# Patient Record
Sex: MALE | Race: WHITE | NOT HISPANIC OR LATINO
[De-identification: names, ages, dates, MRNs, and addresses within clinical notes are randomized per-mention and may not be internally consistent; named-entity substitution may affect disease eponyms.]

---
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
## 2023-01-04 ENCOUNTER — APPOINTMENT (OUTPATIENT)
Dept: GASTROENTEROLOGY | Facility: CLINIC | Age: 18
End: 2023-01-04
Payer: COMMERCIAL

## 2023-01-04 VITALS
HEART RATE: 71 BPM | HEIGHT: 70 IN | WEIGHT: 169 LBS | BODY MASS INDEX: 24.2 KG/M2 | TEMPERATURE: 97.9 F | DIASTOLIC BLOOD PRESSURE: 84 MMHG | SYSTOLIC BLOOD PRESSURE: 144 MMHG | OXYGEN SATURATION: 98 % | RESPIRATION RATE: 16 BRPM

## 2023-01-04 PROCEDURE — 99214 OFFICE O/P EST MOD 30 MIN: CPT

## 2023-01-05 RX ORDER — INFLIXIMAB-DYYB 120 MG/ML
700 INJECTION SUBCUTANEOUS ONCE
Refills: 0 | Status: COMPLETED | OUTPATIENT
Start: 2023-01-06 | End: 2023-01-06

## 2023-01-05 NOTE — PHYSICAL EXAM
[Healthy Appearing] : healthy appearing [No Acute Distress] : no acute distress [Sclera] : the sclera and conjunctiva were normal [Hearing Threshold Finger Rub Not [place]] : hearing was normal [Normal Appearance] : the appearance of the neck was normal [No Acc Muscle Use] : no accessory muscle use [Auscultation Breath Sounds / Voice Sounds] : lungs were clear to auscultation bilaterally [Heart Rate And Rhythm] : heart rate was normal and rhythm regular [Normal S1, S2] : normal S1 and S2 [Bowel Sounds] : normal bowel sounds [Abdomen Tenderness] : non-tender [No Masses] : no abdominal mass palpated [Abdomen Soft] : soft [No CVA Tenderness] : no CVA  tenderness [No Spinal Tenderness] : no spinal tenderness [Abnormal Walk] : normal gait [] : no rash [No Focal Deficits] : no focal deficits [Oriented To Time, Place, And Person] : oriented to person, place, and time [Normal Insight/Judgment] : insight and judgment were intact

## 2023-01-06 ENCOUNTER — OUTPATIENT (OUTPATIENT)
Dept: OUTPATIENT SERVICES | Facility: HOSPITAL | Age: 18
LOS: 1 days | End: 2023-01-06
Payer: COMMERCIAL

## 2023-01-06 ENCOUNTER — APPOINTMENT (OUTPATIENT)
Dept: INFUSION THERAPY | Facility: CLINIC | Age: 18
End: 2023-01-06

## 2023-01-06 VITALS
HEART RATE: 84 BPM | RESPIRATION RATE: 18 BRPM | TEMPERATURE: 98 F | SYSTOLIC BLOOD PRESSURE: 117 MMHG | OXYGEN SATURATION: 99 % | DIASTOLIC BLOOD PRESSURE: 62 MMHG

## 2023-01-06 DIAGNOSIS — K50.90 CROHN'S DISEASE, UNSPECIFIED, WITHOUT COMPLICATIONS: ICD-10-CM

## 2023-01-06 LAB
ALBUMIN SERPL ELPH-MCNC: 4.8 G/DL — SIGNIFICANT CHANGE UP (ref 3.3–5)
ALP SERPL-CCNC: 66 U/L — SIGNIFICANT CHANGE UP (ref 40–120)
ALT FLD-CCNC: 17 U/L — SIGNIFICANT CHANGE UP (ref 10–45)
ANION GAP SERPL CALC-SCNC: 11 MMOL/L — SIGNIFICANT CHANGE UP (ref 5–17)
AST SERPL-CCNC: 22 U/L — SIGNIFICANT CHANGE UP (ref 10–40)
BILIRUB SERPL-MCNC: 0.4 MG/DL — SIGNIFICANT CHANGE UP (ref 0.2–1.2)
BUN SERPL-MCNC: 14 MG/DL — SIGNIFICANT CHANGE UP (ref 7–23)
CALCIUM SERPL-MCNC: 9.9 MG/DL — SIGNIFICANT CHANGE UP (ref 8.4–10.5)
CHLORIDE SERPL-SCNC: 103 MMOL/L — SIGNIFICANT CHANGE UP (ref 96–108)
CO2 SERPL-SCNC: 26 MMOL/L — SIGNIFICANT CHANGE UP (ref 22–31)
CREAT SERPL-MCNC: 0.84 MG/DL — SIGNIFICANT CHANGE UP (ref 0.5–1.3)
CRP SERPL-MCNC: <3 MG/L — SIGNIFICANT CHANGE UP (ref 0–4)
EGFR: 129 ML/MIN/1.73M2 — SIGNIFICANT CHANGE UP
GLUCOSE SERPL-MCNC: 79 MG/DL — SIGNIFICANT CHANGE UP (ref 70–99)
HCT VFR BLD CALC: 43.5 % — SIGNIFICANT CHANGE UP (ref 39–50)
HGB BLD-MCNC: 15.2 G/DL — SIGNIFICANT CHANGE UP (ref 13–17)
MCHC RBC-ENTMCNC: 30.4 PG — SIGNIFICANT CHANGE UP (ref 27–34)
MCHC RBC-ENTMCNC: 34.9 GM/DL — SIGNIFICANT CHANGE UP (ref 32–36)
MCV RBC AUTO: 87 FL — SIGNIFICANT CHANGE UP (ref 80–100)
NRBC # BLD: 0 /100 WBCS — SIGNIFICANT CHANGE UP (ref 0–0)
PLATELET # BLD AUTO: 317 K/UL — SIGNIFICANT CHANGE UP (ref 150–400)
POTASSIUM SERPL-MCNC: 4.2 MMOL/L — SIGNIFICANT CHANGE UP (ref 3.5–5.3)
POTASSIUM SERPL-SCNC: 4.2 MMOL/L — SIGNIFICANT CHANGE UP (ref 3.5–5.3)
PROT SERPL-MCNC: 7.8 G/DL — SIGNIFICANT CHANGE UP (ref 6–8.3)
RBC # BLD: 5 M/UL — SIGNIFICANT CHANGE UP (ref 4.2–5.8)
RBC # FLD: 12.3 % — SIGNIFICANT CHANGE UP (ref 10.3–14.5)
SODIUM SERPL-SCNC: 140 MMOL/L — SIGNIFICANT CHANGE UP (ref 135–145)
WBC # BLD: 7.26 K/UL — SIGNIFICANT CHANGE UP (ref 3.8–10.5)
WBC # FLD AUTO: 7.26 K/UL — SIGNIFICANT CHANGE UP (ref 3.8–10.5)

## 2023-01-06 PROCEDURE — 80053 COMPREHEN METABOLIC PANEL: CPT

## 2023-01-06 PROCEDURE — 36415 COLL VENOUS BLD VENIPUNCTURE: CPT

## 2023-01-06 PROCEDURE — 80230 DRUG ASSAY INFLIXIMAB: CPT

## 2023-01-06 PROCEDURE — 82542 COL CHROMOTOGRAPHY QUAL/QUAN: CPT

## 2023-01-06 PROCEDURE — 85027 COMPLETE CBC AUTOMATED: CPT

## 2023-01-06 PROCEDURE — 96415 CHEMO IV INFUSION ADDL HR: CPT

## 2023-01-06 PROCEDURE — 86140 C-REACTIVE PROTEIN: CPT

## 2023-01-06 PROCEDURE — 96413 CHEMO IV INFUSION 1 HR: CPT

## 2023-01-06 RX ADMIN — INFLIXIMAB-DYYB 155 MILLIGRAM(S): 120 INJECTION SUBCUTANEOUS at 14:02

## 2023-01-06 RX ADMIN — INFLIXIMAB-DYYB 700 MILLIGRAM(S): 120 INJECTION SUBCUTANEOUS at 16:05

## 2023-01-15 NOTE — ASSESSMENT
[FreeTextEntry1] : Pt is a 18 y/o male with ileal CD diagnosed age 12/13 and started on IFX at that time, now on IFX 10mg/kg q5 weeks presenting for routine f/u. Pt reports feeling well with 1-2 daily BM with no urgency, bleeding, or abdominal pain. Denies N/V, and able to tolerate regular meals. He does tend toward constipation at times but not to a significant degree as per pt. 3/2022 calpro wnl. CRP remains wnl. Last IFX level 12/2022 was 12.2 with no abs. Last EGD/colonoscopy 2016 as above.  Mother is neuro onc at [employer].\par \par # Ileal CD; in clinical remission\par - c/w IFX 10mg/kg q5 weeks\par - currently getting dosed over 2hrs since initiating therapy, but never had infusion rxn; will attempt to decrease to 90 min infusion\par - Will plan for colonoscopy as pt will begin surveillance in upcoming year\par - reports requiring "extra" dose of anesthesia in the past and is very anxious about having similar experience; may require low dose anxiolytic prior to procedure\par \par #HCM\par S/p COVID vaccination including booster\par S/p Flu vaccination this season\par Dermatology referral previously given, not gone yet; reminded again today and explained r/b of tnf drugs. Pt states he will go to derm his family uses on [place]\par HPV vaccine with PCP\par PCP - Benny Crump \par Student at [employer] and will be coming to the city for treatment. \par \par f/u post procedure\par \par \par Philip Silver DO\par [facility] IBD Fellow \par

## 2023-01-15 NOTE — HISTORY OF PRESENT ILLNESS
[FreeTextEntry1] : Pt is a 18 y/o male with ileal CD diagnosed age [age] and started on IFX at that time, now on IFX 10mg/kg q5 weeks presenting for routine f/u. Pt reports feeling well with 1-2 daily BM with no urgency, bleeding, or abdominal pain. Denies N/V, and able to tolerate regular meals. He does tend toward constipation at times but not to a significant degree as per pt. 3/2022 calpro wnl. CRP remains wnl. Last IFX level 12/2022 was 12.2 with no abs. Last EGD/colonoscopy 2016 as below.\par \par Prior HPI:\par \par 17 y/o Male with Ileal CD, first seen in 12/2021 to transition care to Adult clinic. Currently on IFX 10 mg/kg q5 weeks. \par \par Reports no active complains \par Stools at baseline- 1-2/day, no blood or mucous \par Denies fever, chills, nausea, vomiting, melena, hematochezia, diarrhea, nocturnal symptoms\par Good appetite and stable weight. \par No EIMs (Joint pain, mouth ulcers, skin rash)\par \par F Melvin not done yet\par Last Drug levels 10.5 with no antibodies \par \par Previous history \par Patient was initially diagnosed at 11 years of age, Chief complain was excessive fatigue, persistent fever and eventually diarrhea. Found to have terminal ileal CD and started on IFX with improvement in symptoms, Has been on IFX since then, infusion frequency changed according to drug levels. No reported flares/hospitalizations. Currently denies any symptoms. Regular 1-2 BM/day with no blood or mucous. \par \par EGD and Colon 7/2016\par EGD - \par Proximal esophagus polyps - \par Distal esophagus with white plaques\par Prepyloric erythema, superficial ulceration \par Duodenal bulb - scattered superficial ulcerations and while plaques\par \par Colon \par Scattered aphthae in patchy distribution form sigmoid at 20 cm to the cecum\par Superficial ulcerations were seen in patchy distribution from splenic flexure to the cecum \par Deep linear ulcerations in patchy distribution up to 15 cm of the terminal ileum, 15-30 cm with some erythema but no ulcerations. \par \par US Abdomen 7/2016\par Wall thickening (0.4 cm) and areas of hyperemia in distal 12 cm of TI\par No evidence of stricture, although there is luminal narrowing and diminished peristalsis throughout most of the involved terminal ileum \par 1 slightly thickened bowel loop left lower quadrant \par \par Capsule 2017 - Normal study with no evidence of active small bowel CD\par \par His drug levels have been good with no antibodies detected. \par As per mother his CRP and Platelets track the inflammation. \par Currently a college student at [employer],\par Denies any recreational drug use,\par Occasional alcohol use\par \par Family history significant for Mother with Raynaud, Maternal Grandfather with sjogren disease; Father side of family with Lymphoma.

## 2023-01-15 NOTE — CONSULT LETTER
[Dear  ___] : Dear  [unfilled], [Courtesy Letter:] : I had the pleasure of seeing your patient, [unfilled], in my office today. [Please see my note below.] : Please see my note below. [Sincerely,] : Sincerely, [FreeTextEntry3] : Subhash Hensley MD\par Professor of Medicine\par Chief of GI\par Director IBD Program\par [facility]\par

## 2023-02-10 ENCOUNTER — APPOINTMENT (OUTPATIENT)
Dept: INFUSION THERAPY | Facility: CLINIC | Age: 18
End: 2023-02-10

## 2023-02-10 ENCOUNTER — OUTPATIENT (OUTPATIENT)
Dept: OUTPATIENT SERVICES | Facility: HOSPITAL | Age: 18
LOS: 1 days | End: 2023-02-10
Payer: COMMERCIAL

## 2023-02-10 VITALS
OXYGEN SATURATION: 98 % | HEART RATE: 74 BPM | RESPIRATION RATE: 18 BRPM | TEMPERATURE: 99 F | SYSTOLIC BLOOD PRESSURE: 135 MMHG | DIASTOLIC BLOOD PRESSURE: 82 MMHG

## 2023-02-10 DIAGNOSIS — K50.90 CROHN'S DISEASE, UNSPECIFIED, WITHOUT COMPLICATIONS: ICD-10-CM

## 2023-02-10 LAB
ALBUMIN SERPL ELPH-MCNC: 4.7 G/DL — SIGNIFICANT CHANGE UP (ref 3.3–5)
ALP SERPL-CCNC: 70 U/L — SIGNIFICANT CHANGE UP (ref 40–120)
ALT FLD-CCNC: 15 U/L — SIGNIFICANT CHANGE UP (ref 10–45)
ANION GAP SERPL CALC-SCNC: 10 MMOL/L — SIGNIFICANT CHANGE UP (ref 5–17)
AST SERPL-CCNC: 20 U/L — SIGNIFICANT CHANGE UP (ref 10–40)
BILIRUB SERPL-MCNC: 0.2 MG/DL — SIGNIFICANT CHANGE UP (ref 0.2–1.2)
BUN SERPL-MCNC: 12 MG/DL — SIGNIFICANT CHANGE UP (ref 7–23)
CALCIUM SERPL-MCNC: 9.9 MG/DL — SIGNIFICANT CHANGE UP (ref 8.4–10.5)
CHLORIDE SERPL-SCNC: 105 MMOL/L — SIGNIFICANT CHANGE UP (ref 96–108)
CO2 SERPL-SCNC: 28 MMOL/L — SIGNIFICANT CHANGE UP (ref 22–31)
CREAT SERPL-MCNC: 0.76 MG/DL — SIGNIFICANT CHANGE UP (ref 0.5–1.3)
CRP SERPL-MCNC: <3 MG/L — SIGNIFICANT CHANGE UP (ref 0–4)
EGFR: 133 ML/MIN/1.73M2 — SIGNIFICANT CHANGE UP
GLUCOSE SERPL-MCNC: 91 MG/DL — SIGNIFICANT CHANGE UP (ref 70–99)
HCT VFR BLD CALC: 42.2 % — SIGNIFICANT CHANGE UP (ref 39–50)
HGB BLD-MCNC: 14.7 G/DL — SIGNIFICANT CHANGE UP (ref 13–17)
MCHC RBC-ENTMCNC: 30.6 PG — SIGNIFICANT CHANGE UP (ref 27–34)
MCHC RBC-ENTMCNC: 34.8 GM/DL — SIGNIFICANT CHANGE UP (ref 32–36)
MCV RBC AUTO: 87.9 FL — SIGNIFICANT CHANGE UP (ref 80–100)
NRBC # BLD: 0 /100 WBCS — SIGNIFICANT CHANGE UP (ref 0–0)
PLATELET # BLD AUTO: 286 K/UL — SIGNIFICANT CHANGE UP (ref 150–400)
POTASSIUM SERPL-MCNC: 4.3 MMOL/L — SIGNIFICANT CHANGE UP (ref 3.5–5.3)
POTASSIUM SERPL-SCNC: 4.3 MMOL/L — SIGNIFICANT CHANGE UP (ref 3.5–5.3)
PROT SERPL-MCNC: 7.6 G/DL — SIGNIFICANT CHANGE UP (ref 6–8.3)
RBC # BLD: 4.8 M/UL — SIGNIFICANT CHANGE UP (ref 4.2–5.8)
RBC # FLD: 12.4 % — SIGNIFICANT CHANGE UP (ref 10.3–14.5)
SODIUM SERPL-SCNC: 143 MMOL/L — SIGNIFICANT CHANGE UP (ref 135–145)
WBC # BLD: 6.37 K/UL — SIGNIFICANT CHANGE UP (ref 3.8–10.5)
WBC # FLD AUTO: 6.37 K/UL — SIGNIFICANT CHANGE UP (ref 3.8–10.5)

## 2023-02-10 PROCEDURE — 85027 COMPLETE CBC AUTOMATED: CPT

## 2023-02-10 PROCEDURE — 80230 DRUG ASSAY INFLIXIMAB: CPT

## 2023-02-10 PROCEDURE — 36415 COLL VENOUS BLD VENIPUNCTURE: CPT

## 2023-02-10 PROCEDURE — 80053 COMPREHEN METABOLIC PANEL: CPT

## 2023-02-10 PROCEDURE — 96413 CHEMO IV INFUSION 1 HR: CPT

## 2023-02-10 PROCEDURE — 86140 C-REACTIVE PROTEIN: CPT

## 2023-02-10 PROCEDURE — 82542 COL CHROMOTOGRAPHY QUAL/QUAN: CPT

## 2023-02-10 RX ORDER — INFLIXIMAB-DYYB 120 MG/ML
700 INJECTION SUBCUTANEOUS ONCE
Refills: 0 | Status: COMPLETED | OUTPATIENT
Start: 2023-02-10 | End: 2023-02-10

## 2023-02-10 RX ADMIN — INFLIXIMAB-DYYB 700 MILLIGRAM(S): 120 INJECTION SUBCUTANEOUS at 11:15

## 2023-02-10 RX ADMIN — INFLIXIMAB-DYYB 155 MILLIGRAM(S): 120 INJECTION SUBCUTANEOUS at 09:45

## 2023-02-14 LAB
ANTIBODIES TO INFLIXIMAB (ATI) CONCENTRATION: < 3.1 U/ML — SIGNIFICANT CHANGE UP
PROMETHEUS ANSER IFX RESULT: SIGNIFICANT CHANGE UP
PROMETHEUS LABORATORY FOOTER: SIGNIFICANT CHANGE UP
SERUM INFLIXIMAB (IFX) CONCENTRATION: 12.7 UG/ML — SIGNIFICANT CHANGE UP

## 2023-03-17 ENCOUNTER — APPOINTMENT (OUTPATIENT)
Dept: INFUSION THERAPY | Facility: CLINIC | Age: 18
End: 2023-03-17

## 2023-03-17 ENCOUNTER — OUTPATIENT (OUTPATIENT)
Dept: OUTPATIENT SERVICES | Facility: HOSPITAL | Age: 18
LOS: 1 days | End: 2023-03-17
Payer: COMMERCIAL

## 2023-03-17 VITALS
DIASTOLIC BLOOD PRESSURE: 81 MMHG | RESPIRATION RATE: 18 BRPM | HEART RATE: 61 BPM | TEMPERATURE: 98 F | OXYGEN SATURATION: 98 % | SYSTOLIC BLOOD PRESSURE: 134 MMHG

## 2023-03-17 VITALS
HEART RATE: 68 BPM | DIASTOLIC BLOOD PRESSURE: 83 MMHG | TEMPERATURE: 98 F | RESPIRATION RATE: 18 BRPM | SYSTOLIC BLOOD PRESSURE: 150 MMHG | OXYGEN SATURATION: 97 %

## 2023-03-17 DIAGNOSIS — K50.90 CROHN'S DISEASE, UNSPECIFIED, WITHOUT COMPLICATIONS: ICD-10-CM

## 2023-03-17 LAB
ALBUMIN SERPL ELPH-MCNC: 4.8 G/DL — SIGNIFICANT CHANGE UP (ref 3.3–5)
ALP SERPL-CCNC: 60 U/L — SIGNIFICANT CHANGE UP (ref 40–120)
ALT FLD-CCNC: 16 U/L — SIGNIFICANT CHANGE UP (ref 10–45)
ANION GAP SERPL CALC-SCNC: 8 MMOL/L — SIGNIFICANT CHANGE UP (ref 5–17)
AST SERPL-CCNC: 25 U/L — SIGNIFICANT CHANGE UP (ref 10–40)
BILIRUB SERPL-MCNC: 0.4 MG/DL — SIGNIFICANT CHANGE UP (ref 0.2–1.2)
BUN SERPL-MCNC: 15 MG/DL — SIGNIFICANT CHANGE UP (ref 7–23)
CALCIUM SERPL-MCNC: 10.1 MG/DL — SIGNIFICANT CHANGE UP (ref 8.4–10.5)
CHLORIDE SERPL-SCNC: 104 MMOL/L — SIGNIFICANT CHANGE UP (ref 96–108)
CO2 SERPL-SCNC: 29 MMOL/L — SIGNIFICANT CHANGE UP (ref 22–31)
CREAT SERPL-MCNC: 0.82 MG/DL — SIGNIFICANT CHANGE UP (ref 0.5–1.3)
CRP SERPL-MCNC: <3 MG/L — SIGNIFICANT CHANGE UP (ref 0–4)
EGFR: 130 ML/MIN/1.73M2 — SIGNIFICANT CHANGE UP
GLUCOSE SERPL-MCNC: 101 MG/DL — HIGH (ref 70–99)
HCT VFR BLD CALC: 41.8 % — SIGNIFICANT CHANGE UP (ref 39–50)
HGB BLD-MCNC: 14.7 G/DL — SIGNIFICANT CHANGE UP (ref 13–17)
MCHC RBC-ENTMCNC: 30.6 PG — SIGNIFICANT CHANGE UP (ref 27–34)
MCHC RBC-ENTMCNC: 35.2 GM/DL — SIGNIFICANT CHANGE UP (ref 32–36)
MCV RBC AUTO: 87.1 FL — SIGNIFICANT CHANGE UP (ref 80–100)
NRBC # BLD: 0 /100 WBCS — SIGNIFICANT CHANGE UP (ref 0–0)
PLATELET # BLD AUTO: 310 K/UL — SIGNIFICANT CHANGE UP (ref 150–400)
POTASSIUM SERPL-MCNC: 4.3 MMOL/L — SIGNIFICANT CHANGE UP (ref 3.5–5.3)
POTASSIUM SERPL-SCNC: 4.3 MMOL/L — SIGNIFICANT CHANGE UP (ref 3.5–5.3)
PROT SERPL-MCNC: 7.5 G/DL — SIGNIFICANT CHANGE UP (ref 6–8.3)
RBC # BLD: 4.8 M/UL — SIGNIFICANT CHANGE UP (ref 4.2–5.8)
RBC # FLD: 12 % — SIGNIFICANT CHANGE UP (ref 10.3–14.5)
SODIUM SERPL-SCNC: 141 MMOL/L — SIGNIFICANT CHANGE UP (ref 135–145)
WBC # BLD: 8.73 K/UL — SIGNIFICANT CHANGE UP (ref 3.8–10.5)
WBC # FLD AUTO: 8.73 K/UL — SIGNIFICANT CHANGE UP (ref 3.8–10.5)

## 2023-03-17 PROCEDURE — 86140 C-REACTIVE PROTEIN: CPT

## 2023-03-17 PROCEDURE — 36415 COLL VENOUS BLD VENIPUNCTURE: CPT

## 2023-03-17 PROCEDURE — 82542 COL CHROMOTOGRAPHY QUAL/QUAN: CPT

## 2023-03-17 PROCEDURE — 85027 COMPLETE CBC AUTOMATED: CPT

## 2023-03-17 PROCEDURE — 80230 DRUG ASSAY INFLIXIMAB: CPT

## 2023-03-17 PROCEDURE — 80053 COMPREHEN METABOLIC PANEL: CPT

## 2023-03-17 PROCEDURE — 96413 CHEMO IV INFUSION 1 HR: CPT

## 2023-03-17 RX ORDER — INFLIXIMAB-DYYB 120 MG/ML
700 INJECTION SUBCUTANEOUS ONCE
Refills: 0 | Status: COMPLETED | OUTPATIENT
Start: 2023-03-17 | End: 2023-03-17

## 2023-03-17 RX ADMIN — INFLIXIMAB-DYYB 155 MILLIGRAM(S): 120 INJECTION SUBCUTANEOUS at 09:30

## 2023-03-17 RX ADMIN — INFLIXIMAB-DYYB 700 MILLIGRAM(S): 120 INJECTION SUBCUTANEOUS at 11:00

## 2023-04-21 ENCOUNTER — APPOINTMENT (OUTPATIENT)
Dept: INFUSION THERAPY | Facility: CLINIC | Age: 18
End: 2023-04-21

## 2023-04-21 ENCOUNTER — OUTPATIENT (OUTPATIENT)
Dept: OUTPATIENT SERVICES | Facility: HOSPITAL | Age: 18
LOS: 1 days | End: 2023-04-21
Payer: COMMERCIAL

## 2023-04-21 VITALS
TEMPERATURE: 98 F | DIASTOLIC BLOOD PRESSURE: 67 MMHG | WEIGHT: 169.98 LBS | OXYGEN SATURATION: 99 % | HEIGHT: 70 IN | HEART RATE: 78 BPM | SYSTOLIC BLOOD PRESSURE: 147 MMHG | RESPIRATION RATE: 18 BRPM

## 2023-04-21 VITALS
DIASTOLIC BLOOD PRESSURE: 74 MMHG | RESPIRATION RATE: 18 BRPM | TEMPERATURE: 98 F | HEART RATE: 74 BPM | OXYGEN SATURATION: 99 % | SYSTOLIC BLOOD PRESSURE: 120 MMHG

## 2023-04-21 DIAGNOSIS — K50.90 CROHN'S DISEASE, UNSPECIFIED, WITHOUT COMPLICATIONS: ICD-10-CM

## 2023-04-21 LAB
ALBUMIN SERPL ELPH-MCNC: 4.8 G/DL — SIGNIFICANT CHANGE UP (ref 3.3–5)
ALP SERPL-CCNC: 69 U/L — SIGNIFICANT CHANGE UP (ref 40–120)
ALT FLD-CCNC: 15 U/L — SIGNIFICANT CHANGE UP (ref 10–45)
ANION GAP SERPL CALC-SCNC: 13 MMOL/L — SIGNIFICANT CHANGE UP (ref 5–17)
AST SERPL-CCNC: 26 U/L — SIGNIFICANT CHANGE UP (ref 10–40)
BILIRUB SERPL-MCNC: 0.3 MG/DL — SIGNIFICANT CHANGE UP (ref 0.2–1.2)
BUN SERPL-MCNC: 13 MG/DL — SIGNIFICANT CHANGE UP (ref 7–23)
CALCIUM SERPL-MCNC: 9.5 MG/DL — SIGNIFICANT CHANGE UP (ref 8.4–10.5)
CHLORIDE SERPL-SCNC: 106 MMOL/L — SIGNIFICANT CHANGE UP (ref 96–108)
CO2 SERPL-SCNC: 23 MMOL/L — SIGNIFICANT CHANGE UP (ref 22–31)
CREAT SERPL-MCNC: 0.78 MG/DL — SIGNIFICANT CHANGE UP (ref 0.5–1.3)
CRP SERPL-MCNC: <3 MG/L — SIGNIFICANT CHANGE UP (ref 0–4)
EGFR: 131 ML/MIN/1.73M2 — SIGNIFICANT CHANGE UP
GLUCOSE SERPL-MCNC: 100 MG/DL — HIGH (ref 70–99)
HCT VFR BLD CALC: 44.3 % — SIGNIFICANT CHANGE UP (ref 39–50)
HGB BLD-MCNC: 15.2 G/DL — SIGNIFICANT CHANGE UP (ref 13–17)
MCHC RBC-ENTMCNC: 30.5 PG — SIGNIFICANT CHANGE UP (ref 27–34)
MCHC RBC-ENTMCNC: 34.3 GM/DL — SIGNIFICANT CHANGE UP (ref 32–36)
MCV RBC AUTO: 88.8 FL — SIGNIFICANT CHANGE UP (ref 80–100)
NRBC # BLD: 0 /100 WBCS — SIGNIFICANT CHANGE UP (ref 0–0)
PLATELET # BLD AUTO: 277 K/UL — SIGNIFICANT CHANGE UP (ref 150–400)
POTASSIUM SERPL-MCNC: 4.4 MMOL/L — SIGNIFICANT CHANGE UP (ref 3.5–5.3)
POTASSIUM SERPL-SCNC: 4.4 MMOL/L — SIGNIFICANT CHANGE UP (ref 3.5–5.3)
PROT SERPL-MCNC: 7.4 G/DL — SIGNIFICANT CHANGE UP (ref 6–8.3)
RBC # BLD: 4.99 M/UL — SIGNIFICANT CHANGE UP (ref 4.2–5.8)
RBC # FLD: 12.3 % — SIGNIFICANT CHANGE UP (ref 10.3–14.5)
SODIUM SERPL-SCNC: 142 MMOL/L — SIGNIFICANT CHANGE UP (ref 135–145)
WBC # BLD: 6.88 K/UL — SIGNIFICANT CHANGE UP (ref 3.8–10.5)
WBC # FLD AUTO: 6.88 K/UL — SIGNIFICANT CHANGE UP (ref 3.8–10.5)

## 2023-04-21 PROCEDURE — 96413 CHEMO IV INFUSION 1 HR: CPT

## 2023-04-21 PROCEDURE — 85027 COMPLETE CBC AUTOMATED: CPT

## 2023-04-21 PROCEDURE — 82542 COL CHROMOTOGRAPHY QUAL/QUAN: CPT

## 2023-04-21 PROCEDURE — 36415 COLL VENOUS BLD VENIPUNCTURE: CPT

## 2023-04-21 PROCEDURE — 80230 DRUG ASSAY INFLIXIMAB: CPT

## 2023-04-21 PROCEDURE — 80053 COMPREHEN METABOLIC PANEL: CPT

## 2023-04-21 PROCEDURE — 86140 C-REACTIVE PROTEIN: CPT

## 2023-04-21 RX ORDER — INFLIXIMAB-DYYB 120 MG/ML
700 INJECTION SUBCUTANEOUS ONCE
Refills: 0 | Status: COMPLETED | OUTPATIENT
Start: 2023-04-21 | End: 2023-04-21

## 2023-04-21 RX ADMIN — INFLIXIMAB-DYYB 700 MILLIGRAM(S): 120 INJECTION SUBCUTANEOUS at 13:20

## 2023-04-21 RX ADMIN — INFLIXIMAB-DYYB 155 MILLIGRAM(S): 120 INJECTION SUBCUTANEOUS at 11:50

## 2023-05-19 ENCOUNTER — OUTPATIENT (OUTPATIENT)
Dept: OUTPATIENT SERVICES | Facility: HOSPITAL | Age: 18
LOS: 1 days | End: 2023-05-19
Payer: COMMERCIAL

## 2023-05-19 ENCOUNTER — APPOINTMENT (OUTPATIENT)
Dept: INFUSION THERAPY | Facility: CLINIC | Age: 18
End: 2023-05-19

## 2023-05-19 VITALS
HEART RATE: 65 BPM | SYSTOLIC BLOOD PRESSURE: 131 MMHG | WEIGHT: 169.98 LBS | RESPIRATION RATE: 18 BRPM | DIASTOLIC BLOOD PRESSURE: 66 MMHG | OXYGEN SATURATION: 99 % | HEIGHT: 70 IN | TEMPERATURE: 97 F

## 2023-05-19 VITALS
DIASTOLIC BLOOD PRESSURE: 76 MMHG | HEART RATE: 67 BPM | TEMPERATURE: 98 F | SYSTOLIC BLOOD PRESSURE: 117 MMHG | RESPIRATION RATE: 18 BRPM | OXYGEN SATURATION: 98 %

## 2023-05-19 DIAGNOSIS — K50.90 CROHN'S DISEASE, UNSPECIFIED, WITHOUT COMPLICATIONS: ICD-10-CM

## 2023-05-19 LAB
ALBUMIN SERPL ELPH-MCNC: 4.5 G/DL — SIGNIFICANT CHANGE UP (ref 3.3–5)
ALP SERPL-CCNC: 56 U/L — SIGNIFICANT CHANGE UP (ref 40–120)
ALT FLD-CCNC: 15 U/L — SIGNIFICANT CHANGE UP (ref 10–45)
ANION GAP SERPL CALC-SCNC: 7 MMOL/L — SIGNIFICANT CHANGE UP (ref 5–17)
AST SERPL-CCNC: 19 U/L — SIGNIFICANT CHANGE UP (ref 10–40)
BILIRUB SERPL-MCNC: 0.5 MG/DL — SIGNIFICANT CHANGE UP (ref 0.2–1.2)
BUN SERPL-MCNC: 10 MG/DL — SIGNIFICANT CHANGE UP (ref 7–23)
CALCIUM SERPL-MCNC: 9.8 MG/DL — SIGNIFICANT CHANGE UP (ref 8.4–10.5)
CHLORIDE SERPL-SCNC: 101 MMOL/L — SIGNIFICANT CHANGE UP (ref 96–108)
CO2 SERPL-SCNC: 30 MMOL/L — SIGNIFICANT CHANGE UP (ref 22–31)
CREAT SERPL-MCNC: 0.84 MG/DL — SIGNIFICANT CHANGE UP (ref 0.5–1.3)
CRP SERPL-MCNC: <3 MG/L — SIGNIFICANT CHANGE UP (ref 0–4)
EGFR: 128 ML/MIN/1.73M2 — SIGNIFICANT CHANGE UP
GLUCOSE SERPL-MCNC: 90 MG/DL — SIGNIFICANT CHANGE UP (ref 70–99)
HCT VFR BLD CALC: 43.3 % — SIGNIFICANT CHANGE UP (ref 39–50)
HGB BLD-MCNC: 15 G/DL — SIGNIFICANT CHANGE UP (ref 13–17)
MCHC RBC-ENTMCNC: 30 PG — SIGNIFICANT CHANGE UP (ref 27–34)
MCHC RBC-ENTMCNC: 34.6 GM/DL — SIGNIFICANT CHANGE UP (ref 32–36)
MCV RBC AUTO: 86.6 FL — SIGNIFICANT CHANGE UP (ref 80–100)
NRBC # BLD: 0 /100 WBCS — SIGNIFICANT CHANGE UP (ref 0–0)
PLATELET # BLD AUTO: 288 K/UL — SIGNIFICANT CHANGE UP (ref 150–400)
POTASSIUM SERPL-MCNC: 4.4 MMOL/L — SIGNIFICANT CHANGE UP (ref 3.5–5.3)
POTASSIUM SERPL-SCNC: 4.4 MMOL/L — SIGNIFICANT CHANGE UP (ref 3.5–5.3)
PROT SERPL-MCNC: 7.3 G/DL — SIGNIFICANT CHANGE UP (ref 6–8.3)
RBC # BLD: 5 M/UL — SIGNIFICANT CHANGE UP (ref 4.2–5.8)
RBC # FLD: 12.1 % — SIGNIFICANT CHANGE UP (ref 10.3–14.5)
SODIUM SERPL-SCNC: 138 MMOL/L — SIGNIFICANT CHANGE UP (ref 135–145)
WBC # BLD: 7.53 K/UL — SIGNIFICANT CHANGE UP (ref 3.8–10.5)
WBC # FLD AUTO: 7.53 K/UL — SIGNIFICANT CHANGE UP (ref 3.8–10.5)

## 2023-05-19 PROCEDURE — 80230 DRUG ASSAY INFLIXIMAB: CPT

## 2023-05-19 PROCEDURE — 85027 COMPLETE CBC AUTOMATED: CPT

## 2023-05-19 PROCEDURE — 36415 COLL VENOUS BLD VENIPUNCTURE: CPT

## 2023-05-19 PROCEDURE — 80053 COMPREHEN METABOLIC PANEL: CPT

## 2023-05-19 PROCEDURE — 96413 CHEMO IV INFUSION 1 HR: CPT

## 2023-05-19 PROCEDURE — 82542 COL CHROMOTOGRAPHY QUAL/QUAN: CPT

## 2023-05-19 PROCEDURE — 86140 C-REACTIVE PROTEIN: CPT

## 2023-05-19 PROCEDURE — 96415 CHEMO IV INFUSION ADDL HR: CPT

## 2023-05-19 RX ORDER — INFLIXIMAB-DYYB 120 MG/ML
700 INJECTION SUBCUTANEOUS ONCE
Refills: 0 | Status: COMPLETED | OUTPATIENT
Start: 2023-05-19 | End: 2023-05-19

## 2023-05-19 RX ADMIN — INFLIXIMAB-DYYB 700 MILLIGRAM(S): 120 INJECTION SUBCUTANEOUS at 12:30

## 2023-05-19 RX ADMIN — INFLIXIMAB-DYYB 155 MILLIGRAM(S): 120 INJECTION SUBCUTANEOUS at 10:41

## 2023-05-24 ENCOUNTER — APPOINTMENT (OUTPATIENT)
Dept: GASTROENTEROLOGY | Facility: CLINIC | Age: 18
End: 2023-05-24

## 2023-05-25 LAB
ANTIBODIES TO INFLIXIMAB (ATI) CONCENTRATION: < 3.1 U/ML — SIGNIFICANT CHANGE UP
PROMETHEUS ANSER IFX RESULT: SIGNIFICANT CHANGE UP
PROMETHEUS LABORATORY FOOTER: SIGNIFICANT CHANGE UP
SERUM INFLIXIMAB (IFX) CONCENTRATION: 19 UG/ML — SIGNIFICANT CHANGE UP

## 2023-06-23 ENCOUNTER — APPOINTMENT (OUTPATIENT)
Dept: INFUSION THERAPY | Facility: CLINIC | Age: 18
End: 2023-06-23

## 2023-08-04 ENCOUNTER — APPOINTMENT (OUTPATIENT)
Dept: INFUSION THERAPY | Facility: CLINIC | Age: 18
End: 2023-08-04

## 2023-08-04 ENCOUNTER — OUTPATIENT (OUTPATIENT)
Dept: OUTPATIENT SERVICES | Facility: HOSPITAL | Age: 18
LOS: 1 days | End: 2023-08-04
Payer: COMMERCIAL

## 2023-08-04 VITALS
HEART RATE: 75 BPM | RESPIRATION RATE: 20 BRPM | WEIGHT: 165.13 LBS | OXYGEN SATURATION: 99 % | HEIGHT: 70 IN | SYSTOLIC BLOOD PRESSURE: 136 MMHG | DIASTOLIC BLOOD PRESSURE: 84 MMHG | TEMPERATURE: 99 F

## 2023-08-04 DIAGNOSIS — K50.90 CROHN'S DISEASE, UNSPECIFIED, WITHOUT COMPLICATIONS: ICD-10-CM

## 2023-08-04 LAB
ALBUMIN SERPL ELPH-MCNC: 4.5 G/DL — SIGNIFICANT CHANGE UP (ref 3.3–5)
ALP SERPL-CCNC: 60 U/L — SIGNIFICANT CHANGE UP (ref 40–120)
ALT FLD-CCNC: 15 U/L — SIGNIFICANT CHANGE UP (ref 10–45)
ANION GAP SERPL CALC-SCNC: 9 MMOL/L — SIGNIFICANT CHANGE UP (ref 5–17)
AST SERPL-CCNC: 17 U/L — SIGNIFICANT CHANGE UP (ref 10–40)
BILIRUB SERPL-MCNC: 0.4 MG/DL — SIGNIFICANT CHANGE UP (ref 0.2–1.2)
BUN SERPL-MCNC: 13 MG/DL — SIGNIFICANT CHANGE UP (ref 7–23)
CALCIUM SERPL-MCNC: 9.8 MG/DL — SIGNIFICANT CHANGE UP (ref 8.4–10.5)
CHLORIDE SERPL-SCNC: 103 MMOL/L — SIGNIFICANT CHANGE UP (ref 96–108)
CO2 SERPL-SCNC: 26 MMOL/L — SIGNIFICANT CHANGE UP (ref 22–31)
CREAT SERPL-MCNC: 0.73 MG/DL — SIGNIFICANT CHANGE UP (ref 0.5–1.3)
CRP SERPL-MCNC: 3.2 MG/L — SIGNIFICANT CHANGE UP (ref 0–4)
EGFR: 134 ML/MIN/1.73M2 — SIGNIFICANT CHANGE UP
GLUCOSE SERPL-MCNC: 84 MG/DL — SIGNIFICANT CHANGE UP (ref 70–99)
HCT VFR BLD CALC: 40.8 % — SIGNIFICANT CHANGE UP (ref 39–50)
HGB BLD-MCNC: 14.4 G/DL — SIGNIFICANT CHANGE UP (ref 13–17)
MCHC RBC-ENTMCNC: 30.6 PG — SIGNIFICANT CHANGE UP (ref 27–34)
MCHC RBC-ENTMCNC: 35.3 GM/DL — SIGNIFICANT CHANGE UP (ref 32–36)
MCV RBC AUTO: 86.8 FL — SIGNIFICANT CHANGE UP (ref 80–100)
NRBC # BLD: 0 /100 WBCS — SIGNIFICANT CHANGE UP (ref 0–0)
PLATELET # BLD AUTO: 293 K/UL — SIGNIFICANT CHANGE UP (ref 150–400)
POTASSIUM SERPL-MCNC: 4 MMOL/L — SIGNIFICANT CHANGE UP (ref 3.5–5.3)
POTASSIUM SERPL-SCNC: 4 MMOL/L — SIGNIFICANT CHANGE UP (ref 3.5–5.3)
PROT SERPL-MCNC: 7.3 G/DL — SIGNIFICANT CHANGE UP (ref 6–8.3)
RBC # BLD: 4.7 M/UL — SIGNIFICANT CHANGE UP (ref 4.2–5.8)
RBC # FLD: 11.9 % — SIGNIFICANT CHANGE UP (ref 10.3–14.5)
SODIUM SERPL-SCNC: 138 MMOL/L — SIGNIFICANT CHANGE UP (ref 135–145)
WBC # BLD: 7.6 K/UL — SIGNIFICANT CHANGE UP (ref 3.8–10.5)
WBC # FLD AUTO: 7.6 K/UL — SIGNIFICANT CHANGE UP (ref 3.8–10.5)

## 2023-08-04 PROCEDURE — 96413 CHEMO IV INFUSION 1 HR: CPT

## 2023-08-04 PROCEDURE — 36415 COLL VENOUS BLD VENIPUNCTURE: CPT

## 2023-08-04 PROCEDURE — 85027 COMPLETE CBC AUTOMATED: CPT

## 2023-08-04 PROCEDURE — 86140 C-REACTIVE PROTEIN: CPT

## 2023-08-04 PROCEDURE — 80053 COMPREHEN METABOLIC PANEL: CPT

## 2023-08-04 RX ORDER — INFLIXIMAB-DYYB 120 MG/ML
700 INJECTION SUBCUTANEOUS ONCE
Refills: 0 | Status: COMPLETED | OUTPATIENT
Start: 2023-08-04 | End: 2023-08-04

## 2023-08-04 RX ADMIN — INFLIXIMAB-DYYB 700 MILLIGRAM(S): 120 INJECTION SUBCUTANEOUS at 11:15

## 2023-08-04 RX ADMIN — INFLIXIMAB-DYYB 206.67 MILLIGRAM(S): 120 INJECTION SUBCUTANEOUS at 09:46

## 2023-08-16 ENCOUNTER — APPOINTMENT (OUTPATIENT)
Dept: GASTROENTEROLOGY | Facility: CLINIC | Age: 18
End: 2023-08-16
Payer: COMMERCIAL

## 2023-08-16 VITALS
DIASTOLIC BLOOD PRESSURE: 85 MMHG | RESPIRATION RATE: 18 BRPM | WEIGHT: 166.4 LBS | SYSTOLIC BLOOD PRESSURE: 139 MMHG | HEART RATE: 109 BPM | TEMPERATURE: 97.9 F | OXYGEN SATURATION: 98 % | BODY MASS INDEX: 23.82 KG/M2 | HEIGHT: 70 IN

## 2023-08-16 DIAGNOSIS — K50.00 CROHN'S DISEASE OF SMALL INTESTINE W/OUT COMPLICATIONS: ICD-10-CM

## 2023-08-16 PROCEDURE — 99213 OFFICE O/P EST LOW 20 MIN: CPT

## 2023-08-16 RX ORDER — NIRMATRELVIR AND RITONAVIR 300-100 MG
20 X 150 MG & KIT ORAL
Qty: 1 | Refills: 0 | Status: COMPLETED | COMMUNITY
Start: 2022-03-31 | End: 2023-08-16

## 2023-08-18 NOTE — ASSESSMENT
[FreeTextEntry1] : 17 yo M, Ileocolonic Crohn's, diagnosed age 10/13 (approx 2015), on IFX since, stable in clinical remission on IFX 10 q5 here for routine follow up  routine labs with normal cbc / cmp , crp with blip up to 3.2 , patient had sore throat at time, usually undetectable  Last IFX ab undetectable and level 19   Ileocolonic Crohn's Disease in Clinical Remission - c/w IFX 10mg/kg q5 weeks - we discussed possibility of Injectable version, and typically would be offered to a patient on a less accelerated infusion schedule, as well as possibility of spreading out infusions to q6, etc. Will hold off for now based on shared decision making - currently getting dosed over 2hrs since initiating therapy, but never had infusion rxn; will attempt to decrease to 90 min infusion - Schedule Colonoscopy for surveillance, 8 years from diagnosis, during winter break - LHGV with Miralax prep - reports requiring "extra" dose of anesthesia in the past and is very anxious about having similar experience; may require low dose anxiolytic prior to procedure - send CRP, CMP, CBC, IFX levels and ab at next infusion in 4 weeks   #HCM S/p COVID vaccination including booster S/p Flu vaccination this season Dermatology referral previously given, not gone yet; reminded again today and explained r/b of tnf drugs. Pt states he will go to derm his family uses on [place] HPV vaccine with PCP PCP - Benny Crump Student at [employer] and will be coming to the city for treatment.  f/u post procedure  Seen and Discussed with Dr. Ayden Bright MD GI Fellow [facility] Gastroenterology

## 2023-08-18 NOTE — REASON FOR VISIT
[Follow-up] : a follow-up of an existing diagnosis [FreeTextEntry1] : f/u of Ileocolonic Crohn's on IFX 10 q 5

## 2023-08-18 NOTE — HISTORY OF PRESENT ILLNESS
[FreeTextEntry1] : 19 yo M, Ileocolonic Crohn's, diagnosed age [age] (approx 2015), on IFX since, stable in clinical remission on IFX 10 q5 here for routine follow up  traveled abroad for the summer and had a dose of IFX a week early. One infusion overseas  Clinically he feels "normal" with no complaints Excited to go back to [employer] , studying philosophy / [employer]  normal brown bm 1-2 per day wt stable no f/c/n/v/d  ----LAST HPI---- Pt is a 16 y/o male with ileal CD diagnosed age 12/13 and started on IFX at that time, now on IFX 10mg/kg q5 weeks presenting for routine f/u. Pt reports feeling well with 1-2 daily BM with no urgency, bleeding, or abdominal pain. Denies N/V, and able to tolerate regular meals. He does tend toward constipation at times but not to a significant degree as per pt. 3/2022 calpro wnl. CRP remains wnl. Last IFX level 12/2022 was 12.2 with no abs. Last EGD/colonoscopy 2016 as below.    Prior HPI: 15 y/o Male with Ileal CD, first seen in 12/2021 to transition care to Adult clinic. Currently on IFX 10 mg/kg q5 weeks. Reports no active complains Stools at baseline- 1-2/day, no blood or mucous Denies fever, chills, nausea, vomiting, melena, hematochezia, diarrhea, nocturnal symptoms Good appetite and stable weight. No EIMs (Joint pain, mouth ulcers, skin rash)    F Melvin not done yet Last Drug levels 10.5 with no antibodies   Previous history  Patient was initially diagnosed at 11 years of age, Chief complain was excessive fatigue, persistent fever and eventually diarrhea. Found to have terminal ileal CD and started on IFX with improvement in symptoms, Has been on IFX since then, infusion frequency changed according to drug levels. No reported flares/hospitalizations. Currently denies any symptoms. Regular 1-2 BM/day with no blood or mucous.    EGD and Colon 7/2016  EGD -  Proximal esophagus polyps -  Distal esophagus with white plaques  Prepyloric erythema, superficial ulceration  Duodenal bulb - scattered superficial ulcerations and while plaques    Colon  Scattered aphthae in patchy distribution form sigmoid at 20 cm to the cecum  Superficial ulcerations were seen in patchy distribution from splenic flexure to the cecum  Deep linear ulcerations in patchy distribution up to 15 cm of the terminal ileum, 15-30 cm with some erythema but no ulcerations.    US Abdomen 7/2016  Wall thickening (0.4 cm) and areas of hyperemia in distal 12 cm of TI  No evidence of stricture, although there is luminal narrowing and diminished peristalsis throughout most of the involved terminal ileum  1 slightly thickened bowel loop left lower quadrant    Capsule 2017 - Normal study with no evidence of active small bowel CD    His drug levels have been good with no antibodies detected.  As per mother his CRP and Platelets track the inflammation.  Currently a college student at [employer],  Denies any recreational drug use,  Occasional alcohol use    Family history significant for Mother with Raynaud, Maternal Grandfather with sjogren disease; Father side of family with Lymphoma.

## 2023-08-18 NOTE — PHYSICAL EXAM
[Hearing Threshold Finger Rub Not [place]] : hearing was normal [Normal Lips/Gums] : the lips and gums were normal [No Respiratory Distress] : no respiratory distress [None] : no edema [Normal] : normal bowel sounds, non-tender, no masses, soft, no no hepato-splenomegaly [Abnormal Walk] : normal gait [No Clubbing, Cyanosis] : no clubbing or cyanosis of the fingernails [No Joint Swelling] : no joint swelling seen [Normal Color / Pigmentation] : normal skin color and pigmentation [] : no rash [Normal Affect] : the affect was normal [Recent Memory Normal] : recent memory was not impaired [Normal Mood] : the mood was normal [Remote Memory Normal] : remote memory was not impaired [de-identified] : large tonsils

## 2023-09-08 ENCOUNTER — OUTPATIENT (OUTPATIENT)
Dept: OUTPATIENT SERVICES | Facility: HOSPITAL | Age: 18
LOS: 1 days | End: 2023-09-08
Payer: COMMERCIAL

## 2023-09-08 ENCOUNTER — APPOINTMENT (OUTPATIENT)
Dept: INFUSION THERAPY | Facility: CLINIC | Age: 18
End: 2023-09-08

## 2023-09-08 VITALS
HEART RATE: 64 BPM | DIASTOLIC BLOOD PRESSURE: 89 MMHG | RESPIRATION RATE: 18 BRPM | SYSTOLIC BLOOD PRESSURE: 149 MMHG | OXYGEN SATURATION: 100 % | TEMPERATURE: 97 F

## 2023-09-08 VITALS
WEIGHT: 164.91 LBS | DIASTOLIC BLOOD PRESSURE: 81 MMHG | HEIGHT: 70 IN | TEMPERATURE: 97 F | OXYGEN SATURATION: 97 % | RESPIRATION RATE: 18 BRPM | HEART RATE: 63 BPM | SYSTOLIC BLOOD PRESSURE: 135 MMHG

## 2023-09-08 DIAGNOSIS — K50.90 CROHN'S DISEASE, UNSPECIFIED, WITHOUT COMPLICATIONS: ICD-10-CM

## 2023-09-08 LAB
ALBUMIN SERPL ELPH-MCNC: 4.8 G/DL — SIGNIFICANT CHANGE UP (ref 3.3–5)
ALP SERPL-CCNC: 61 U/L — SIGNIFICANT CHANGE UP (ref 40–120)
ALT FLD-CCNC: 17 U/L — SIGNIFICANT CHANGE UP (ref 10–45)
ANION GAP SERPL CALC-SCNC: 7 MMOL/L — SIGNIFICANT CHANGE UP (ref 5–17)
AST SERPL-CCNC: 19 U/L — SIGNIFICANT CHANGE UP (ref 10–40)
BILIRUB SERPL-MCNC: 0.4 MG/DL — SIGNIFICANT CHANGE UP (ref 0.2–1.2)
BUN SERPL-MCNC: 12 MG/DL — SIGNIFICANT CHANGE UP (ref 7–23)
CALCIUM SERPL-MCNC: 9.9 MG/DL — SIGNIFICANT CHANGE UP (ref 8.4–10.5)
CHLORIDE SERPL-SCNC: 103 MMOL/L — SIGNIFICANT CHANGE UP (ref 96–108)
CO2 SERPL-SCNC: 27 MMOL/L — SIGNIFICANT CHANGE UP (ref 22–31)
CREAT SERPL-MCNC: 0.74 MG/DL — SIGNIFICANT CHANGE UP (ref 0.5–1.3)
CRP SERPL-MCNC: <3 MG/L — SIGNIFICANT CHANGE UP (ref 0–4)
EGFR: 133 ML/MIN/1.73M2 — SIGNIFICANT CHANGE UP
GLUCOSE SERPL-MCNC: 85 MG/DL — SIGNIFICANT CHANGE UP (ref 70–99)
HCT VFR BLD CALC: 42.4 % — SIGNIFICANT CHANGE UP (ref 39–50)
HGB BLD-MCNC: 14.7 G/DL — SIGNIFICANT CHANGE UP (ref 13–17)
MCHC RBC-ENTMCNC: 30.7 PG — SIGNIFICANT CHANGE UP (ref 27–34)
MCHC RBC-ENTMCNC: 34.7 GM/DL — SIGNIFICANT CHANGE UP (ref 32–36)
MCV RBC AUTO: 88.5 FL — SIGNIFICANT CHANGE UP (ref 80–100)
NRBC # BLD: 0 /100 WBCS — SIGNIFICANT CHANGE UP (ref 0–0)
PLATELET # BLD AUTO: 318 K/UL — SIGNIFICANT CHANGE UP (ref 150–400)
POTASSIUM SERPL-MCNC: 4 MMOL/L — SIGNIFICANT CHANGE UP (ref 3.5–5.3)
POTASSIUM SERPL-SCNC: 4 MMOL/L — SIGNIFICANT CHANGE UP (ref 3.5–5.3)
PROT SERPL-MCNC: 7.6 G/DL — SIGNIFICANT CHANGE UP (ref 6–8.3)
RBC # BLD: 4.79 M/UL — SIGNIFICANT CHANGE UP (ref 4.2–5.8)
RBC # FLD: 12.2 % — SIGNIFICANT CHANGE UP (ref 10.3–14.5)
SODIUM SERPL-SCNC: 137 MMOL/L — SIGNIFICANT CHANGE UP (ref 135–145)
WBC # BLD: 7.1 K/UL — SIGNIFICANT CHANGE UP (ref 3.8–10.5)
WBC # FLD AUTO: 7.1 K/UL — SIGNIFICANT CHANGE UP (ref 3.8–10.5)

## 2023-09-08 PROCEDURE — 85027 COMPLETE CBC AUTOMATED: CPT

## 2023-09-08 PROCEDURE — 36415 COLL VENOUS BLD VENIPUNCTURE: CPT

## 2023-09-08 PROCEDURE — 80053 COMPREHEN METABOLIC PANEL: CPT

## 2023-09-08 PROCEDURE — 80230 DRUG ASSAY INFLIXIMAB: CPT

## 2023-09-08 PROCEDURE — 96413 CHEMO IV INFUSION 1 HR: CPT

## 2023-09-08 PROCEDURE — 86140 C-REACTIVE PROTEIN: CPT

## 2023-09-08 PROCEDURE — 82397 CHEMILUMINESCENT ASSAY: CPT

## 2023-09-08 RX ORDER — INFLIXIMAB-DYYB 120 MG/ML
700 INJECTION SUBCUTANEOUS ONCE
Refills: 0 | Status: COMPLETED | OUTPATIENT
Start: 2023-09-08 | End: 2023-09-08

## 2023-09-08 RX ADMIN — INFLIXIMAB-DYYB 700 MILLIGRAM(S): 120 INJECTION SUBCUTANEOUS at 12:20

## 2023-09-08 RX ADMIN — INFLIXIMAB-DYYB 206.67 MILLIGRAM(S): 120 INJECTION SUBCUTANEOUS at 10:51

## 2023-09-14 LAB
INFLIXIMAB AB SERPL-MCNC: 34 NG/ML — SIGNIFICANT CHANGE UP
INFLIXIMAB SERPL-MCNC: 9.3 UG/ML — SIGNIFICANT CHANGE UP

## 2023-10-13 ENCOUNTER — APPOINTMENT (OUTPATIENT)
Dept: INFUSION THERAPY | Facility: CLINIC | Age: 18
End: 2023-10-13

## 2023-10-13 ENCOUNTER — OUTPATIENT (OUTPATIENT)
Dept: OUTPATIENT SERVICES | Facility: HOSPITAL | Age: 18
LOS: 1 days | End: 2023-10-13
Payer: COMMERCIAL

## 2023-10-13 VITALS
RESPIRATION RATE: 18 BRPM | TEMPERATURE: 98 F | OXYGEN SATURATION: 98 % | SYSTOLIC BLOOD PRESSURE: 126 MMHG | DIASTOLIC BLOOD PRESSURE: 66 MMHG | HEART RATE: 66 BPM

## 2023-10-13 VITALS
DIASTOLIC BLOOD PRESSURE: 83 MMHG | TEMPERATURE: 98 F | SYSTOLIC BLOOD PRESSURE: 138 MMHG | OXYGEN SATURATION: 99 % | WEIGHT: 169.98 LBS | HEIGHT: 70 IN | RESPIRATION RATE: 18 BRPM | HEART RATE: 68 BPM

## 2023-10-13 DIAGNOSIS — K50.90 CROHN'S DISEASE, UNSPECIFIED, WITHOUT COMPLICATIONS: ICD-10-CM

## 2023-10-13 LAB
ALBUMIN SERPL ELPH-MCNC: 4.7 G/DL — SIGNIFICANT CHANGE UP (ref 3.3–5)
ALP SERPL-CCNC: 69 U/L — SIGNIFICANT CHANGE UP (ref 40–120)
ALT FLD-CCNC: 20 U/L — SIGNIFICANT CHANGE UP (ref 10–45)
ANION GAP SERPL CALC-SCNC: 9 MMOL/L — SIGNIFICANT CHANGE UP (ref 5–17)
AST SERPL-CCNC: 25 U/L — SIGNIFICANT CHANGE UP (ref 10–40)
BILIRUB SERPL-MCNC: 0.3 MG/DL — SIGNIFICANT CHANGE UP (ref 0.2–1.2)
BUN SERPL-MCNC: 17 MG/DL — SIGNIFICANT CHANGE UP (ref 7–23)
CALCIUM SERPL-MCNC: 9.7 MG/DL — SIGNIFICANT CHANGE UP (ref 8.4–10.5)
CHLORIDE SERPL-SCNC: 101 MMOL/L — SIGNIFICANT CHANGE UP (ref 96–108)
CO2 SERPL-SCNC: 25 MMOL/L — SIGNIFICANT CHANGE UP (ref 22–31)
CREAT SERPL-MCNC: 0.84 MG/DL — SIGNIFICANT CHANGE UP (ref 0.5–1.3)
CRP SERPL-MCNC: <3 MG/L — SIGNIFICANT CHANGE UP (ref 0–4)
EGFR: 128 ML/MIN/1.73M2 — SIGNIFICANT CHANGE UP
GLUCOSE SERPL-MCNC: 89 MG/DL — SIGNIFICANT CHANGE UP (ref 70–99)
HCT VFR BLD CALC: 42.1 % — SIGNIFICANT CHANGE UP (ref 39–50)
HGB BLD-MCNC: 14.9 G/DL — SIGNIFICANT CHANGE UP (ref 13–17)
MCHC RBC-ENTMCNC: 30.8 PG — SIGNIFICANT CHANGE UP (ref 27–34)
MCHC RBC-ENTMCNC: 35.4 GM/DL — SIGNIFICANT CHANGE UP (ref 32–36)
MCV RBC AUTO: 87 FL — SIGNIFICANT CHANGE UP (ref 80–100)
NRBC # BLD: 0 /100 WBCS — SIGNIFICANT CHANGE UP (ref 0–0)
PLATELET # BLD AUTO: 354 K/UL — SIGNIFICANT CHANGE UP (ref 150–400)
POTASSIUM SERPL-MCNC: 4.4 MMOL/L — SIGNIFICANT CHANGE UP (ref 3.5–5.3)
POTASSIUM SERPL-SCNC: 4.4 MMOL/L — SIGNIFICANT CHANGE UP (ref 3.5–5.3)
PROT SERPL-MCNC: 7.5 G/DL — SIGNIFICANT CHANGE UP (ref 6–8.3)
RBC # BLD: 4.84 M/UL — SIGNIFICANT CHANGE UP (ref 4.2–5.8)
RBC # FLD: 12.7 % — SIGNIFICANT CHANGE UP (ref 10.3–14.5)
SODIUM SERPL-SCNC: 135 MMOL/L — SIGNIFICANT CHANGE UP (ref 135–145)
WBC # BLD: 8.86 K/UL — SIGNIFICANT CHANGE UP (ref 3.8–10.5)
WBC # FLD AUTO: 8.86 K/UL — SIGNIFICANT CHANGE UP (ref 3.8–10.5)

## 2023-10-13 PROCEDURE — 96415 CHEMO IV INFUSION ADDL HR: CPT

## 2023-10-13 PROCEDURE — 80053 COMPREHEN METABOLIC PANEL: CPT

## 2023-10-13 PROCEDURE — 36415 COLL VENOUS BLD VENIPUNCTURE: CPT

## 2023-10-13 PROCEDURE — 96413 CHEMO IV INFUSION 1 HR: CPT

## 2023-10-13 PROCEDURE — 85027 COMPLETE CBC AUTOMATED: CPT

## 2023-10-13 PROCEDURE — 86140 C-REACTIVE PROTEIN: CPT

## 2023-10-13 RX ORDER — INFLIXIMAB-DYYB 120 MG/ML
700 INJECTION SUBCUTANEOUS ONCE
Refills: 0 | Status: COMPLETED | OUTPATIENT
Start: 2023-10-13 | End: 2023-10-13

## 2023-10-13 RX ADMIN — INFLIXIMAB-DYYB 206.67 MILLIGRAM(S): 120 INJECTION SUBCUTANEOUS at 16:22

## 2023-10-13 RX ADMIN — INFLIXIMAB-DYYB 700 MILLIGRAM(S): 120 INJECTION SUBCUTANEOUS at 17:52

## 2023-11-17 ENCOUNTER — APPOINTMENT (OUTPATIENT)
Dept: INFUSION THERAPY | Facility: CLINIC | Age: 18
End: 2023-11-17

## 2023-11-17 ENCOUNTER — OUTPATIENT (OUTPATIENT)
Dept: OUTPATIENT SERVICES | Facility: HOSPITAL | Age: 18
LOS: 1 days | End: 2023-11-17
Payer: COMMERCIAL

## 2023-11-17 VITALS
WEIGHT: 175.05 LBS | OXYGEN SATURATION: 98 % | TEMPERATURE: 98 F | RESPIRATION RATE: 18 BRPM | SYSTOLIC BLOOD PRESSURE: 162 MMHG | HEART RATE: 79 BPM | HEIGHT: 70 IN | DIASTOLIC BLOOD PRESSURE: 90 MMHG

## 2023-11-17 DIAGNOSIS — K50.90 CROHN'S DISEASE, UNSPECIFIED, WITHOUT COMPLICATIONS: ICD-10-CM

## 2023-11-17 PROCEDURE — 96415 CHEMO IV INFUSION ADDL HR: CPT

## 2023-11-17 PROCEDURE — 86140 C-REACTIVE PROTEIN: CPT

## 2023-11-17 PROCEDURE — 96413 CHEMO IV INFUSION 1 HR: CPT

## 2023-11-17 PROCEDURE — 80053 COMPREHEN METABOLIC PANEL: CPT

## 2023-11-17 RX ORDER — INFLIXIMAB-DYYB 120 MG/ML
700 INJECTION SUBCUTANEOUS ONCE
Refills: 0 | Status: COMPLETED | OUTPATIENT
Start: 2023-11-17 | End: 2023-11-17

## 2023-11-17 RX ADMIN — INFLIXIMAB-DYYB 155 MILLIGRAM(S): 120 INJECTION SUBCUTANEOUS at 15:55

## 2023-11-17 RX ADMIN — INFLIXIMAB-DYYB 700 MILLIGRAM(S): 120 INJECTION SUBCUTANEOUS at 17:00

## 2023-11-17 NOTE — PHARMACY COMMUNICATION NOTE - COMMENTS
Emailed WILIAM Nick about Remicade dosing. This patient is receiving Remicade 700mg (patient is 77kg) which is a 10% difference using 10mg/kg. I emailed her to ask if it was ok to treat if patient gains any additionak weight. Her Response was:  " Yes that is fine, he's doing well we'll continue at current dosing"   WILIAM Nick.

## 2023-12-22 ENCOUNTER — OUTPATIENT (OUTPATIENT)
Dept: OUTPATIENT SERVICES | Facility: HOSPITAL | Age: 18
LOS: 1 days | End: 2023-12-22
Payer: COMMERCIAL

## 2023-12-22 ENCOUNTER — APPOINTMENT (OUTPATIENT)
Dept: INFUSION THERAPY | Facility: CLINIC | Age: 18
End: 2023-12-22

## 2023-12-22 VITALS
DIASTOLIC BLOOD PRESSURE: 77 MMHG | RESPIRATION RATE: 18 BRPM | WEIGHT: 169.98 LBS | SYSTOLIC BLOOD PRESSURE: 132 MMHG | OXYGEN SATURATION: 97 % | HEIGHT: 70 IN | HEART RATE: 75 BPM | TEMPERATURE: 97 F

## 2023-12-22 DIAGNOSIS — K50.90 CROHN'S DISEASE, UNSPECIFIED, WITHOUT COMPLICATIONS: ICD-10-CM

## 2023-12-22 LAB
ALBUMIN SERPL ELPH-MCNC: 4.2 G/DL — SIGNIFICANT CHANGE UP (ref 3.3–5)
ALBUMIN SERPL ELPH-MCNC: 4.2 G/DL — SIGNIFICANT CHANGE UP (ref 3.3–5)
ALP SERPL-CCNC: 52 U/L — SIGNIFICANT CHANGE UP (ref 40–120)
ALP SERPL-CCNC: 52 U/L — SIGNIFICANT CHANGE UP (ref 40–120)
ALT FLD-CCNC: 16 U/L — SIGNIFICANT CHANGE UP (ref 10–45)
ALT FLD-CCNC: 16 U/L — SIGNIFICANT CHANGE UP (ref 10–45)
ANION GAP SERPL CALC-SCNC: 6 MMOL/L — SIGNIFICANT CHANGE UP (ref 5–17)
ANION GAP SERPL CALC-SCNC: 6 MMOL/L — SIGNIFICANT CHANGE UP (ref 5–17)
AST SERPL-CCNC: 29 U/L — SIGNIFICANT CHANGE UP (ref 10–40)
AST SERPL-CCNC: 29 U/L — SIGNIFICANT CHANGE UP (ref 10–40)
BILIRUB SERPL-MCNC: 0.9 MG/DL — SIGNIFICANT CHANGE UP (ref 0.2–1.2)
BILIRUB SERPL-MCNC: 0.9 MG/DL — SIGNIFICANT CHANGE UP (ref 0.2–1.2)
BUN SERPL-MCNC: 13 MG/DL — SIGNIFICANT CHANGE UP (ref 7–23)
BUN SERPL-MCNC: 13 MG/DL — SIGNIFICANT CHANGE UP (ref 7–23)
CALCIUM SERPL-MCNC: 10.9 MG/DL — HIGH (ref 8.4–10.5)
CALCIUM SERPL-MCNC: 10.9 MG/DL — HIGH (ref 8.4–10.5)
CHLORIDE SERPL-SCNC: 106 MMOL/L — SIGNIFICANT CHANGE UP (ref 96–108)
CHLORIDE SERPL-SCNC: 106 MMOL/L — SIGNIFICANT CHANGE UP (ref 96–108)
CO2 SERPL-SCNC: 30 MMOL/L — SIGNIFICANT CHANGE UP (ref 22–31)
CO2 SERPL-SCNC: 30 MMOL/L — SIGNIFICANT CHANGE UP (ref 22–31)
CREAT SERPL-MCNC: 0.7 MG/DL — SIGNIFICANT CHANGE UP (ref 0.5–1.3)
CREAT SERPL-MCNC: 0.7 MG/DL — SIGNIFICANT CHANGE UP (ref 0.5–1.3)
CRP SERPL-MCNC: <3 MG/L — SIGNIFICANT CHANGE UP (ref 0–4)
CRP SERPL-MCNC: <3 MG/L — SIGNIFICANT CHANGE UP (ref 0–4)
EGFR: 135 ML/MIN/1.73M2 — SIGNIFICANT CHANGE UP
EGFR: 135 ML/MIN/1.73M2 — SIGNIFICANT CHANGE UP
GLUCOSE SERPL-MCNC: 86 MG/DL — SIGNIFICANT CHANGE UP (ref 70–99)
GLUCOSE SERPL-MCNC: 86 MG/DL — SIGNIFICANT CHANGE UP (ref 70–99)
HCT VFR BLD CALC: 43.9 % — SIGNIFICANT CHANGE UP (ref 39–50)
HCT VFR BLD CALC: 43.9 % — SIGNIFICANT CHANGE UP (ref 39–50)
HGB BLD-MCNC: 15.5 G/DL — SIGNIFICANT CHANGE UP (ref 13–17)
HGB BLD-MCNC: 15.5 G/DL — SIGNIFICANT CHANGE UP (ref 13–17)
MCHC RBC-ENTMCNC: 30.6 PG — SIGNIFICANT CHANGE UP (ref 27–34)
MCHC RBC-ENTMCNC: 30.6 PG — SIGNIFICANT CHANGE UP (ref 27–34)
MCHC RBC-ENTMCNC: 35.3 GM/DL — SIGNIFICANT CHANGE UP (ref 32–36)
MCHC RBC-ENTMCNC: 35.3 GM/DL — SIGNIFICANT CHANGE UP (ref 32–36)
MCV RBC AUTO: 86.8 FL — SIGNIFICANT CHANGE UP (ref 80–100)
MCV RBC AUTO: 86.8 FL — SIGNIFICANT CHANGE UP (ref 80–100)
PLATELET # BLD AUTO: 306 K/UL — SIGNIFICANT CHANGE UP (ref 150–400)
PLATELET # BLD AUTO: 306 K/UL — SIGNIFICANT CHANGE UP (ref 150–400)
POTASSIUM SERPL-MCNC: 4.8 MMOL/L — SIGNIFICANT CHANGE UP (ref 3.5–5.3)
POTASSIUM SERPL-MCNC: 4.8 MMOL/L — SIGNIFICANT CHANGE UP (ref 3.5–5.3)
POTASSIUM SERPL-SCNC: 4.8 MMOL/L — SIGNIFICANT CHANGE UP (ref 3.5–5.3)
POTASSIUM SERPL-SCNC: 4.8 MMOL/L — SIGNIFICANT CHANGE UP (ref 3.5–5.3)
PROT SERPL-MCNC: 8 G/DL — SIGNIFICANT CHANGE UP (ref 6–8.3)
PROT SERPL-MCNC: 8 G/DL — SIGNIFICANT CHANGE UP (ref 6–8.3)
RBC # BLD: 5.06 M/UL — SIGNIFICANT CHANGE UP (ref 4.2–5.8)
RBC # BLD: 5.06 M/UL — SIGNIFICANT CHANGE UP (ref 4.2–5.8)
RBC # FLD: 12.7 % — SIGNIFICANT CHANGE UP (ref 10.3–14.5)
RBC # FLD: 12.7 % — SIGNIFICANT CHANGE UP (ref 10.3–14.5)
SODIUM SERPL-SCNC: 142 MMOL/L — SIGNIFICANT CHANGE UP (ref 135–145)
SODIUM SERPL-SCNC: 142 MMOL/L — SIGNIFICANT CHANGE UP (ref 135–145)
WBC # BLD: 7.9 K/UL — SIGNIFICANT CHANGE UP (ref 3.8–10.5)
WBC # BLD: 7.9 K/UL — SIGNIFICANT CHANGE UP (ref 3.8–10.5)
WBC # FLD AUTO: 7.9 K/UL — SIGNIFICANT CHANGE UP (ref 3.8–10.5)
WBC # FLD AUTO: 7.9 K/UL — SIGNIFICANT CHANGE UP (ref 3.8–10.5)

## 2023-12-22 PROCEDURE — 96413 CHEMO IV INFUSION 1 HR: CPT

## 2023-12-22 PROCEDURE — 86140 C-REACTIVE PROTEIN: CPT

## 2023-12-22 PROCEDURE — 80053 COMPREHEN METABOLIC PANEL: CPT

## 2023-12-22 PROCEDURE — 36415 COLL VENOUS BLD VENIPUNCTURE: CPT

## 2023-12-22 PROCEDURE — 85027 COMPLETE CBC AUTOMATED: CPT

## 2023-12-22 RX ORDER — INFLIXIMAB-DYYB 120 MG/ML
700 INJECTION SUBCUTANEOUS ONCE
Refills: 0 | Status: COMPLETED | OUTPATIENT
Start: 2023-12-22 | End: 2023-12-22

## 2023-12-22 RX ADMIN — INFLIXIMAB-DYYB 206.67 MILLIGRAM(S): 120 INJECTION SUBCUTANEOUS at 16:36

## 2023-12-22 RX ADMIN — INFLIXIMAB-DYYB 700 MILLIGRAM(S): 120 INJECTION SUBCUTANEOUS at 17:52

## 2024-01-09 ENCOUNTER — OUTPATIENT (OUTPATIENT)
Dept: OUTPATIENT SERVICES | Facility: HOSPITAL | Age: 19
LOS: 1 days | Discharge: HOME | End: 2024-01-09
Payer: COMMERCIAL

## 2024-01-09 ENCOUNTER — APPOINTMENT (OUTPATIENT)
Dept: GASTROENTEROLOGY | Facility: HOSPITAL | Age: 19
End: 2024-01-09

## 2024-01-09 ENCOUNTER — RESULT REVIEW (OUTPATIENT)
Age: 19
End: 2024-01-09

## 2024-01-09 PROCEDURE — 45380 COLONOSCOPY AND BIOPSY: CPT

## 2024-01-09 PROCEDURE — 88305 TISSUE EXAM BY PATHOLOGIST: CPT | Mod: 26

## 2024-01-09 PROCEDURE — 88305 TISSUE EXAM BY PATHOLOGIST: CPT

## 2024-01-09 NOTE — PRE-ANESTHESIA EVALUATION ADULT - NSANTHOSAYNRD_GEN_A_CORE
No. PERRY screening performed.  STOP BANG Legend: 0-2 = LOW Risk; 3-4 = INTERMEDIATE Risk; 5-8 = HIGH Risk

## 2024-01-11 LAB
SURGICAL PATHOLOGY STUDY: SIGNIFICANT CHANGE UP
SURGICAL PATHOLOGY STUDY: SIGNIFICANT CHANGE UP

## 2024-02-02 ENCOUNTER — APPOINTMENT (OUTPATIENT)
Dept: INFUSION THERAPY | Facility: CLINIC | Age: 19
End: 2024-02-02

## 2024-02-02 ENCOUNTER — OUTPATIENT (OUTPATIENT)
Dept: OUTPATIENT SERVICES | Facility: HOSPITAL | Age: 19
LOS: 1 days | End: 2024-02-02
Payer: COMMERCIAL

## 2024-02-02 VITALS
SYSTOLIC BLOOD PRESSURE: 147 MMHG | DIASTOLIC BLOOD PRESSURE: 85 MMHG | HEIGHT: 70 IN | OXYGEN SATURATION: 98 % | RESPIRATION RATE: 18 BRPM | WEIGHT: 171.96 LBS | HEART RATE: 85 BPM | TEMPERATURE: 98 F

## 2024-02-02 VITALS
RESPIRATION RATE: 18 BRPM | SYSTOLIC BLOOD PRESSURE: 124 MMHG | HEART RATE: 74 BPM | TEMPERATURE: 98 F | OXYGEN SATURATION: 99 % | DIASTOLIC BLOOD PRESSURE: 68 MMHG

## 2024-02-02 DIAGNOSIS — K50.90 CROHN'S DISEASE, UNSPECIFIED, WITHOUT COMPLICATIONS: ICD-10-CM

## 2024-02-02 LAB
ALBUMIN SERPL ELPH-MCNC: 4.5 G/DL — SIGNIFICANT CHANGE UP (ref 3.3–5)
ALP SERPL-CCNC: 67 U/L — SIGNIFICANT CHANGE UP (ref 40–120)
ALT FLD-CCNC: 17 U/L — SIGNIFICANT CHANGE UP (ref 10–45)
ANION GAP SERPL CALC-SCNC: 11 MMOL/L — SIGNIFICANT CHANGE UP (ref 5–17)
AST SERPL-CCNC: 23 U/L — SIGNIFICANT CHANGE UP (ref 10–40)
BILIRUB SERPL-MCNC: 0.2 MG/DL — SIGNIFICANT CHANGE UP (ref 0.2–1.2)
BUN SERPL-MCNC: 18 MG/DL — SIGNIFICANT CHANGE UP (ref 7–23)
CALCIUM SERPL-MCNC: 10 MG/DL — SIGNIFICANT CHANGE UP (ref 8.4–10.5)
CHLORIDE SERPL-SCNC: 103 MMOL/L — SIGNIFICANT CHANGE UP (ref 96–108)
CO2 SERPL-SCNC: 26 MMOL/L — SIGNIFICANT CHANGE UP (ref 22–31)
CREAT SERPL-MCNC: 0.94 MG/DL — SIGNIFICANT CHANGE UP (ref 0.5–1.3)
CRP SERPL-MCNC: <3 MG/L — SIGNIFICANT CHANGE UP (ref 0–4)
EGFR: 119 ML/MIN/1.73M2 — SIGNIFICANT CHANGE UP
GLUCOSE SERPL-MCNC: 88 MG/DL — SIGNIFICANT CHANGE UP (ref 70–99)
HCT VFR BLD CALC: 41.3 % — SIGNIFICANT CHANGE UP (ref 39–50)
HGB BLD-MCNC: 14.7 G/DL — SIGNIFICANT CHANGE UP (ref 13–17)
MCHC RBC-ENTMCNC: 30.8 PG — SIGNIFICANT CHANGE UP (ref 27–34)
MCHC RBC-ENTMCNC: 35.6 GM/DL — SIGNIFICANT CHANGE UP (ref 32–36)
MCV RBC AUTO: 86.6 FL — SIGNIFICANT CHANGE UP (ref 80–100)
NRBC # BLD: 0 /100 WBCS — SIGNIFICANT CHANGE UP (ref 0–0)
PLATELET # BLD AUTO: 284 K/UL — SIGNIFICANT CHANGE UP (ref 150–400)
POTASSIUM SERPL-MCNC: 3.7 MMOL/L — SIGNIFICANT CHANGE UP (ref 3.5–5.3)
POTASSIUM SERPL-SCNC: 3.7 MMOL/L — SIGNIFICANT CHANGE UP (ref 3.5–5.3)
PROT SERPL-MCNC: 7.2 G/DL — SIGNIFICANT CHANGE UP (ref 6–8.3)
RBC # BLD: 4.77 M/UL — SIGNIFICANT CHANGE UP (ref 4.2–5.8)
RBC # FLD: 12.1 % — SIGNIFICANT CHANGE UP (ref 10.3–14.5)
SODIUM SERPL-SCNC: 140 MMOL/L — SIGNIFICANT CHANGE UP (ref 135–145)
WBC # BLD: 6.4 K/UL — SIGNIFICANT CHANGE UP (ref 3.8–10.5)
WBC # FLD AUTO: 6.4 K/UL — SIGNIFICANT CHANGE UP (ref 3.8–10.5)

## 2024-02-02 PROCEDURE — 86140 C-REACTIVE PROTEIN: CPT

## 2024-02-02 PROCEDURE — 85027 COMPLETE CBC AUTOMATED: CPT

## 2024-02-02 PROCEDURE — 96413 CHEMO IV INFUSION 1 HR: CPT

## 2024-02-02 PROCEDURE — 80230 DRUG ASSAY INFLIXIMAB: CPT

## 2024-02-02 PROCEDURE — 82397 CHEMILUMINESCENT ASSAY: CPT

## 2024-02-02 PROCEDURE — 96415 CHEMO IV INFUSION ADDL HR: CPT

## 2024-02-02 PROCEDURE — 36415 COLL VENOUS BLD VENIPUNCTURE: CPT

## 2024-02-02 PROCEDURE — 80053 COMPREHEN METABOLIC PANEL: CPT

## 2024-02-02 RX ORDER — INFLIXIMAB-DYYB 120 MG/ML
700 INJECTION SUBCUTANEOUS ONCE
Refills: 0 | Status: COMPLETED | OUTPATIENT
Start: 2024-02-02 | End: 2024-02-02

## 2024-02-02 RX ADMIN — INFLIXIMAB-DYYB 213.33 MILLIGRAM(S): 120 INJECTION SUBCUTANEOUS at 15:57

## 2024-02-02 RX ADMIN — INFLIXIMAB-DYYB 700 MILLIGRAM(S): 120 INJECTION SUBCUTANEOUS at 17:35

## 2024-02-06 ENCOUNTER — APPOINTMENT (OUTPATIENT)
Dept: GASTROENTEROLOGY | Facility: CLINIC | Age: 19
End: 2024-02-06

## 2024-02-07 ENCOUNTER — APPOINTMENT (OUTPATIENT)
Dept: GASTROENTEROLOGY | Facility: CLINIC | Age: 19
End: 2024-02-07
Payer: COMMERCIAL

## 2024-02-07 PROCEDURE — G2211 COMPLEX E/M VISIT ADD ON: CPT

## 2024-02-07 PROCEDURE — 99214 OFFICE O/P EST MOD 30 MIN: CPT

## 2024-02-07 NOTE — REASON FOR VISIT
[Home] : at home, [unfilled] , at the time of the visit. [Medical Office: ([facility])___] : at the medical office located in  [Patient] : the patient [Follow-up] : a follow-up of an existing diagnosis

## 2024-02-09 NOTE — HISTORY OF PRESENT ILLNESS
[FreeTextEntry1] : This is a 18 year old male with ileocolonic Crohn's disease (diagnosed around 2015, currently on IFX 10mg/kg q5 weeks) who presents today to discuss 1/9/24 colonoscopy results. Patient previously followed with Dr. Dubinski at [facility] for Crohn's disease presenting with fatigue, fever, and diarrhea. Was closely monitored with drug levels and IUS, which led to current IFX dosing regimen. Patient has remained in clinical remission since that time. Most recent colonoscopy 1/9/24 demonstrates normal mucosa in whole colon and terminal ileum. Biopsies without inflammation. Currently has 1-2 non bloody non painful formed bowel movements per day. Denies fatigue, abdominal pain, nausea, vomiting, diarrhea, constipation, hematochezia, and melena. Denies fever, new rash, new joint pain, or extra intestinal manifestations. Recent scheduling issue led to 1 week delay in IFX, meaning a 6 week interval. Patient without symptoms despite increased frequency and asks if this frequency can be continued.

## 2024-02-09 NOTE — ASSESSMENT
[FreeTextEntry1] : This is a 18 year old male with ileocolonic Crohn's disease (diagnosed around 2015, currently on IFX 10mg/kg q5 weeks) who presents today to discuss 1/9/24 colonoscopy results, which demonstrated deep remission. Recent scheduling issue led to 1 week delay in IFX, meaning a 6 week interval. Patient without symptoms despite increased frequency and asks if this frequency can be continued.  #Ileocononic Crohn's disease, in deep remission - With patient in continued deep remission on IFX and feels well despite increased IFX frequency from q5 weeks to q6, reasonable to continue q6 week frequency. Patient to closely monitor for symptom recurrence between infusions - Tasked pharmacist to assist with frequency adjustment - With dose frequency increase, anticipate a drug level drop when evaluated at next infusion, around 20% - Labs from infusion center 2/24 visit personally reviewed and within normal limits. Will continue to evaluate labs from infusion center, specifically CRP given IFX frequency increase - Discussed colonoscopy results in great detail with patient.  - With IFX frequency increase, will plan for repeat colonoscopy 1/2025 for surveillance and mucosal assessment  #HCM Up to date 2023 flu shot - In process of scheduling appointment with dermatology  Follow up in 4 months virtually  DO Brayan Banks Advanced Inflammatory Bowel Disease Fellow

## 2024-02-10 LAB
INFLIXIMAB AB SERPL-MCNC: <22 NG/ML — SIGNIFICANT CHANGE UP
INFLIXIMAB SERPL-MCNC: 6.6 UG/ML — SIGNIFICANT CHANGE UP

## 2024-03-15 ENCOUNTER — APPOINTMENT (OUTPATIENT)
Dept: INFUSION THERAPY | Facility: CLINIC | Age: 19
End: 2024-03-15

## 2024-03-15 ENCOUNTER — OUTPATIENT (OUTPATIENT)
Dept: OUTPATIENT SERVICES | Facility: HOSPITAL | Age: 19
LOS: 1 days | End: 2024-03-15
Payer: COMMERCIAL

## 2024-03-15 VITALS
HEIGHT: 70 IN | RESPIRATION RATE: 18 BRPM | HEART RATE: 74 BPM | OXYGEN SATURATION: 98 % | WEIGHT: 164.91 LBS | TEMPERATURE: 98 F | SYSTOLIC BLOOD PRESSURE: 135 MMHG | DIASTOLIC BLOOD PRESSURE: 81 MMHG

## 2024-03-15 DIAGNOSIS — K50.90 CROHN'S DISEASE, UNSPECIFIED, WITHOUT COMPLICATIONS: ICD-10-CM

## 2024-03-15 LAB
ALBUMIN SERPL ELPH-MCNC: 4.9 G/DL — SIGNIFICANT CHANGE UP (ref 3.3–5)
ALP SERPL-CCNC: 63 U/L — SIGNIFICANT CHANGE UP (ref 40–120)
ALT FLD-CCNC: 15 U/L — SIGNIFICANT CHANGE UP (ref 10–45)
ANION GAP SERPL CALC-SCNC: 10 MMOL/L — SIGNIFICANT CHANGE UP (ref 5–17)
AST SERPL-CCNC: 25 U/L — SIGNIFICANT CHANGE UP (ref 10–40)
BILIRUB SERPL-MCNC: 0.4 MG/DL — SIGNIFICANT CHANGE UP (ref 0.2–1.2)
BUN SERPL-MCNC: 14 MG/DL — SIGNIFICANT CHANGE UP (ref 7–23)
CALCIUM SERPL-MCNC: 10.2 MG/DL — SIGNIFICANT CHANGE UP (ref 8.4–10.5)
CHLORIDE SERPL-SCNC: 103 MMOL/L — SIGNIFICANT CHANGE UP (ref 96–108)
CO2 SERPL-SCNC: 26 MMOL/L — SIGNIFICANT CHANGE UP (ref 22–31)
CREAT SERPL-MCNC: 0.88 MG/DL — SIGNIFICANT CHANGE UP (ref 0.5–1.3)
CRP SERPL-MCNC: <3 MG/L — SIGNIFICANT CHANGE UP (ref 0–4)
EGFR: 126 ML/MIN/1.73M2 — SIGNIFICANT CHANGE UP
GLUCOSE SERPL-MCNC: 71 MG/DL — SIGNIFICANT CHANGE UP (ref 70–99)
HCT VFR BLD CALC: 42.4 % — SIGNIFICANT CHANGE UP (ref 39–50)
HGB BLD-MCNC: 14.7 G/DL — SIGNIFICANT CHANGE UP (ref 13–17)
MCHC RBC-ENTMCNC: 30.2 PG — SIGNIFICANT CHANGE UP (ref 27–34)
MCHC RBC-ENTMCNC: 34.7 GM/DL — SIGNIFICANT CHANGE UP (ref 32–36)
MCV RBC AUTO: 87.1 FL — SIGNIFICANT CHANGE UP (ref 80–100)
NRBC # BLD: 0 /100 WBCS — SIGNIFICANT CHANGE UP (ref 0–0)
PLATELET # BLD AUTO: 305 K/UL — SIGNIFICANT CHANGE UP (ref 150–400)
POTASSIUM SERPL-MCNC: 4 MMOL/L — SIGNIFICANT CHANGE UP (ref 3.5–5.3)
POTASSIUM SERPL-SCNC: 4 MMOL/L — SIGNIFICANT CHANGE UP (ref 3.5–5.3)
PROT SERPL-MCNC: 7.5 G/DL — SIGNIFICANT CHANGE UP (ref 6–8.3)
RBC # BLD: 4.87 M/UL — SIGNIFICANT CHANGE UP (ref 4.2–5.8)
RBC # FLD: 12.4 % — SIGNIFICANT CHANGE UP (ref 10.3–14.5)
SODIUM SERPL-SCNC: 139 MMOL/L — SIGNIFICANT CHANGE UP (ref 135–145)
WBC # BLD: 7.46 K/UL — SIGNIFICANT CHANGE UP (ref 3.8–10.5)
WBC # FLD AUTO: 7.46 K/UL — SIGNIFICANT CHANGE UP (ref 3.8–10.5)

## 2024-03-15 PROCEDURE — 80230 DRUG ASSAY INFLIXIMAB: CPT

## 2024-03-15 PROCEDURE — 82542 COL CHROMOTOGRAPHY QUAL/QUAN: CPT

## 2024-03-15 PROCEDURE — 36415 COLL VENOUS BLD VENIPUNCTURE: CPT

## 2024-03-15 PROCEDURE — 80053 COMPREHEN METABOLIC PANEL: CPT

## 2024-03-15 PROCEDURE — 86140 C-REACTIVE PROTEIN: CPT

## 2024-03-15 PROCEDURE — 96413 CHEMO IV INFUSION 1 HR: CPT

## 2024-03-15 PROCEDURE — 85027 COMPLETE CBC AUTOMATED: CPT

## 2024-03-15 RX ORDER — INFLIXIMAB-DYYB 120 MG/ML
700 INJECTION SUBCUTANEOUS ONCE
Refills: 0 | Status: COMPLETED | OUTPATIENT
Start: 2024-03-15 | End: 2024-03-15

## 2024-03-15 RX ADMIN — INFLIXIMAB-DYYB 206.67 MILLIGRAM(S): 120 INJECTION SUBCUTANEOUS at 16:40

## 2024-03-15 RX ADMIN — INFLIXIMAB-DYYB 700 MILLIGRAM(S): 120 INJECTION SUBCUTANEOUS at 18:00

## 2024-04-22 ENCOUNTER — APPOINTMENT (OUTPATIENT)
Dept: INFUSION THERAPY | Facility: CLINIC | Age: 19
End: 2024-04-22

## 2024-04-22 ENCOUNTER — OUTPATIENT (OUTPATIENT)
Dept: OUTPATIENT SERVICES | Facility: HOSPITAL | Age: 19
LOS: 1 days | End: 2024-04-22
Payer: COMMERCIAL

## 2024-04-22 VITALS
HEIGHT: 70 IN | WEIGHT: 167.99 LBS | RESPIRATION RATE: 18 BRPM | TEMPERATURE: 97 F | HEART RATE: 70 BPM | SYSTOLIC BLOOD PRESSURE: 147 MMHG | OXYGEN SATURATION: 99 % | DIASTOLIC BLOOD PRESSURE: 81 MMHG

## 2024-04-22 VITALS
DIASTOLIC BLOOD PRESSURE: 66 MMHG | TEMPERATURE: 97 F | RESPIRATION RATE: 18 BRPM | HEART RATE: 68 BPM | SYSTOLIC BLOOD PRESSURE: 125 MMHG | OXYGEN SATURATION: 98 %

## 2024-04-22 DIAGNOSIS — K50.90 CROHN'S DISEASE, UNSPECIFIED, WITHOUT COMPLICATIONS: ICD-10-CM

## 2024-04-22 LAB
ALBUMIN SERPL ELPH-MCNC: 4.7 G/DL — SIGNIFICANT CHANGE UP (ref 3.3–5)
ALP SERPL-CCNC: 61 U/L — SIGNIFICANT CHANGE UP (ref 40–120)
ALT FLD-CCNC: 13 U/L — SIGNIFICANT CHANGE UP (ref 10–45)
ANION GAP SERPL CALC-SCNC: 11 MMOL/L — SIGNIFICANT CHANGE UP (ref 5–17)
AST SERPL-CCNC: 22 U/L — SIGNIFICANT CHANGE UP (ref 10–40)
BILIRUB SERPL-MCNC: 0.3 MG/DL — SIGNIFICANT CHANGE UP (ref 0.2–1.2)
BUN SERPL-MCNC: 14 MG/DL — SIGNIFICANT CHANGE UP (ref 7–23)
CALCIUM SERPL-MCNC: 10.1 MG/DL — SIGNIFICANT CHANGE UP (ref 8.4–10.5)
CHLORIDE SERPL-SCNC: 101 MMOL/L — SIGNIFICANT CHANGE UP (ref 96–108)
CO2 SERPL-SCNC: 26 MMOL/L — SIGNIFICANT CHANGE UP (ref 22–31)
CREAT SERPL-MCNC: 0.77 MG/DL — SIGNIFICANT CHANGE UP (ref 0.5–1.3)
CRP SERPL-MCNC: <3 MG/L — SIGNIFICANT CHANGE UP (ref 0–4)
EGFR: 131 ML/MIN/1.73M2 — SIGNIFICANT CHANGE UP
GLUCOSE SERPL-MCNC: 83 MG/DL — SIGNIFICANT CHANGE UP (ref 70–99)
HCT VFR BLD CALC: 40.6 % — SIGNIFICANT CHANGE UP (ref 39–50)
HGB BLD-MCNC: 14.2 G/DL — SIGNIFICANT CHANGE UP (ref 13–17)
MCHC RBC-ENTMCNC: 30.5 PG — SIGNIFICANT CHANGE UP (ref 27–34)
MCHC RBC-ENTMCNC: 35 GM/DL — SIGNIFICANT CHANGE UP (ref 32–36)
MCV RBC AUTO: 87.1 FL — SIGNIFICANT CHANGE UP (ref 80–100)
NRBC # BLD: 0 /100 WBCS — SIGNIFICANT CHANGE UP (ref 0–0)
PLATELET # BLD AUTO: 296 K/UL — SIGNIFICANT CHANGE UP (ref 150–400)
POTASSIUM SERPL-MCNC: 3.6 MMOL/L — SIGNIFICANT CHANGE UP (ref 3.5–5.3)
POTASSIUM SERPL-SCNC: 3.6 MMOL/L — SIGNIFICANT CHANGE UP (ref 3.5–5.3)
PROT SERPL-MCNC: 7.1 G/DL — SIGNIFICANT CHANGE UP (ref 6–8.3)
RBC # BLD: 4.66 M/UL — SIGNIFICANT CHANGE UP (ref 4.2–5.8)
RBC # FLD: 12.1 % — SIGNIFICANT CHANGE UP (ref 10.3–14.5)
SODIUM SERPL-SCNC: 138 MMOL/L — SIGNIFICANT CHANGE UP (ref 135–145)
WBC # BLD: 8.1 K/UL — SIGNIFICANT CHANGE UP (ref 3.8–10.5)
WBC # FLD AUTO: 8.1 K/UL — SIGNIFICANT CHANGE UP (ref 3.8–10.5)

## 2024-04-22 PROCEDURE — 86140 C-REACTIVE PROTEIN: CPT

## 2024-04-22 PROCEDURE — 80230 DRUG ASSAY INFLIXIMAB: CPT

## 2024-04-22 PROCEDURE — 82542 COL CHROMOTOGRAPHY QUAL/QUAN: CPT

## 2024-04-22 PROCEDURE — 96413 CHEMO IV INFUSION 1 HR: CPT

## 2024-04-22 PROCEDURE — 36415 COLL VENOUS BLD VENIPUNCTURE: CPT

## 2024-04-22 PROCEDURE — 85027 COMPLETE CBC AUTOMATED: CPT

## 2024-04-22 PROCEDURE — 80053 COMPREHEN METABOLIC PANEL: CPT

## 2024-04-22 RX ORDER — INFLIXIMAB-DYYB 120 MG/ML
780 INJECTION SUBCUTANEOUS ONCE
Refills: 0 | Status: COMPLETED | OUTPATIENT
Start: 2024-04-22 | End: 2024-04-22

## 2024-04-22 RX ADMIN — INFLIXIMAB-DYYB 780 MILLIGRAM(S): 120 INJECTION SUBCUTANEOUS at 18:05

## 2024-04-22 RX ADMIN — INFLIXIMAB-DYYB 206.67 MILLIGRAM(S): 120 INJECTION SUBCUTANEOUS at 16:35

## 2024-04-25 LAB
ANTIBODIES TO INFLIXIMAB (ATI) CONCENTRATION: < 3.1 U/ML — SIGNIFICANT CHANGE UP
PROMETHEUS ANSER IFX RESULT: SIGNIFICANT CHANGE UP
PROMETHEUS LABORATORY FOOTER: SIGNIFICANT CHANGE UP
SERUM INFLIXIMAB (IFX) CONCENTRATION: 10.1 UG/ML — SIGNIFICANT CHANGE UP

## 2024-05-28 ENCOUNTER — OUTPATIENT (OUTPATIENT)
Dept: OUTPATIENT SERVICES | Facility: HOSPITAL | Age: 19
LOS: 1 days | End: 2024-05-28

## 2024-05-28 DIAGNOSIS — K50.90 CROHN'S DISEASE, UNSPECIFIED, WITHOUT COMPLICATIONS: ICD-10-CM

## 2024-05-31 ENCOUNTER — APPOINTMENT (OUTPATIENT)
Dept: INFUSION THERAPY | Facility: CLINIC | Age: 19
End: 2024-05-31

## 2024-05-31 ENCOUNTER — OUTPATIENT (OUTPATIENT)
Dept: OUTPATIENT SERVICES | Facility: HOSPITAL | Age: 19
LOS: 1 days | End: 2024-05-31
Payer: COMMERCIAL

## 2024-05-31 VITALS
DIASTOLIC BLOOD PRESSURE: 89 MMHG | WEIGHT: 164.02 LBS | HEIGHT: 70 IN | TEMPERATURE: 98 F | SYSTOLIC BLOOD PRESSURE: 129 MMHG | OXYGEN SATURATION: 99 % | RESPIRATION RATE: 17 BRPM | HEART RATE: 65 BPM

## 2024-05-31 VITALS
SYSTOLIC BLOOD PRESSURE: 124 MMHG | DIASTOLIC BLOOD PRESSURE: 75 MMHG | HEART RATE: 74 BPM | OXYGEN SATURATION: 98 % | RESPIRATION RATE: 16 BRPM | TEMPERATURE: 98 F

## 2024-05-31 DIAGNOSIS — K50.90 CROHN'S DISEASE, UNSPECIFIED, WITHOUT COMPLICATIONS: ICD-10-CM

## 2024-05-31 LAB
ALBUMIN SERPL ELPH-MCNC: 4.5 G/DL — SIGNIFICANT CHANGE UP (ref 3.3–5)
ALP SERPL-CCNC: 52 U/L — SIGNIFICANT CHANGE UP (ref 40–120)
ALT FLD-CCNC: 10 U/L — SIGNIFICANT CHANGE UP (ref 10–45)
ANION GAP SERPL CALC-SCNC: 12 MMOL/L — SIGNIFICANT CHANGE UP (ref 5–17)
AST SERPL-CCNC: 18 U/L — SIGNIFICANT CHANGE UP (ref 10–40)
BILIRUB SERPL-MCNC: 0.4 MG/DL — SIGNIFICANT CHANGE UP (ref 0.2–1.2)
BUN SERPL-MCNC: 13 MG/DL — SIGNIFICANT CHANGE UP (ref 7–23)
CALCIUM SERPL-MCNC: 9.6 MG/DL — SIGNIFICANT CHANGE UP (ref 8.4–10.5)
CHLORIDE SERPL-SCNC: 102 MMOL/L — SIGNIFICANT CHANGE UP (ref 96–108)
CO2 SERPL-SCNC: 24 MMOL/L — SIGNIFICANT CHANGE UP (ref 22–31)
CREAT SERPL-MCNC: 0.76 MG/DL — SIGNIFICANT CHANGE UP (ref 0.5–1.3)
CRP SERPL-MCNC: <3 MG/L — SIGNIFICANT CHANGE UP (ref 0–4)
EGFR: 131 ML/MIN/1.73M2 — SIGNIFICANT CHANGE UP
GLUCOSE SERPL-MCNC: 80 MG/DL — SIGNIFICANT CHANGE UP (ref 70–99)
HCT VFR BLD CALC: 43.9 % — SIGNIFICANT CHANGE UP (ref 39–50)
HGB BLD-MCNC: 15.1 G/DL — SIGNIFICANT CHANGE UP (ref 13–17)
MCHC RBC-ENTMCNC: 30.2 PG — SIGNIFICANT CHANGE UP (ref 27–34)
MCHC RBC-ENTMCNC: 34.4 GM/DL — SIGNIFICANT CHANGE UP (ref 32–36)
MCV RBC AUTO: 87.8 FL — SIGNIFICANT CHANGE UP (ref 80–100)
NRBC # BLD: 0 /100 WBCS — SIGNIFICANT CHANGE UP (ref 0–0)
PLATELET # BLD AUTO: 288 K/UL — SIGNIFICANT CHANGE UP (ref 150–400)
POTASSIUM SERPL-MCNC: 4 MMOL/L — SIGNIFICANT CHANGE UP (ref 3.5–5.3)
POTASSIUM SERPL-SCNC: 4 MMOL/L — SIGNIFICANT CHANGE UP (ref 3.5–5.3)
PROT SERPL-MCNC: 7.2 G/DL — SIGNIFICANT CHANGE UP (ref 6–8.3)
RBC # BLD: 5 M/UL — SIGNIFICANT CHANGE UP (ref 4.2–5.8)
RBC # FLD: 12.1 % — SIGNIFICANT CHANGE UP (ref 10.3–14.5)
SODIUM SERPL-SCNC: 138 MMOL/L — SIGNIFICANT CHANGE UP (ref 135–145)
WBC # BLD: 6.24 K/UL — SIGNIFICANT CHANGE UP (ref 3.8–10.5)
WBC # FLD AUTO: 6.24 K/UL — SIGNIFICANT CHANGE UP (ref 3.8–10.5)

## 2024-05-31 PROCEDURE — 82542 COL CHROMOTOGRAPHY QUAL/QUAN: CPT

## 2024-05-31 PROCEDURE — 96413 CHEMO IV INFUSION 1 HR: CPT

## 2024-05-31 PROCEDURE — 86140 C-REACTIVE PROTEIN: CPT

## 2024-05-31 PROCEDURE — 80230 DRUG ASSAY INFLIXIMAB: CPT

## 2024-05-31 PROCEDURE — 85027 COMPLETE CBC AUTOMATED: CPT

## 2024-05-31 PROCEDURE — 80053 COMPREHEN METABOLIC PANEL: CPT

## 2024-05-31 PROCEDURE — 36415 COLL VENOUS BLD VENIPUNCTURE: CPT

## 2024-05-31 RX ORDER — INFLIXIMAB-DYYB 120 MG/ML
780 INJECTION SUBCUTANEOUS ONCE
Refills: 0 | Status: COMPLETED | OUTPATIENT
Start: 2024-05-31 | End: 2024-05-31

## 2024-05-31 RX ORDER — ACETAMINOPHEN 500 MG
500 TABLET ORAL ONCE
Refills: 0 | Status: COMPLETED | OUTPATIENT
Start: 2024-05-31 | End: 2024-05-31

## 2024-05-31 RX ADMIN — INFLIXIMAB-DYYB 206.67 MILLIGRAM(S): 120 INJECTION SUBCUTANEOUS at 16:35

## 2024-05-31 RX ADMIN — Medication 500 MILLIGRAM(S): at 16:11

## 2024-05-31 RX ADMIN — INFLIXIMAB-DYYB 780 MILLIGRAM(S): 120 INJECTION SUBCUTANEOUS at 18:17

## 2024-06-05 LAB
ANTIBODIES TO INFLIXIMAB (ATI) CONCENTRATION: < 3.1 U/ML — SIGNIFICANT CHANGE UP
PROMETHEUS ANSER IFX RESULT: SIGNIFICANT CHANGE UP
PROMETHEUS LABORATORY FOOTER: SIGNIFICANT CHANGE UP
SERUM INFLIXIMAB (IFX) CONCENTRATION: 11.5 UG/ML — SIGNIFICANT CHANGE UP

## 2024-07-08 ENCOUNTER — APPOINTMENT (OUTPATIENT)
Dept: INFUSION THERAPY | Facility: CLINIC | Age: 19
End: 2024-07-08

## 2024-07-08 ENCOUNTER — OUTPATIENT (OUTPATIENT)
Dept: OUTPATIENT SERVICES | Facility: HOSPITAL | Age: 19
LOS: 1 days | End: 2024-07-08
Payer: COMMERCIAL

## 2024-07-08 VITALS
SYSTOLIC BLOOD PRESSURE: 133 MMHG | RESPIRATION RATE: 16 BRPM | TEMPERATURE: 97 F | HEART RATE: 62 BPM | DIASTOLIC BLOOD PRESSURE: 84 MMHG | OXYGEN SATURATION: 97 % | HEIGHT: 70 IN | WEIGHT: 166.01 LBS

## 2024-07-08 VITALS
SYSTOLIC BLOOD PRESSURE: 131 MMHG | RESPIRATION RATE: 16 BRPM | DIASTOLIC BLOOD PRESSURE: 69 MMHG | TEMPERATURE: 97 F | HEART RATE: 82 BPM | OXYGEN SATURATION: 98 %

## 2024-07-08 DIAGNOSIS — K50.90 CROHN'S DISEASE, UNSPECIFIED, WITHOUT COMPLICATIONS: ICD-10-CM

## 2024-07-08 LAB
ALBUMIN SERPL ELPH-MCNC: 4.6 G/DL — SIGNIFICANT CHANGE UP (ref 3.3–5)
ALP SERPL-CCNC: 58 U/L — SIGNIFICANT CHANGE UP (ref 40–120)
ALT FLD-CCNC: 17 U/L — SIGNIFICANT CHANGE UP (ref 10–45)
ANION GAP SERPL CALC-SCNC: 9 MMOL/L — SIGNIFICANT CHANGE UP (ref 5–17)
AST SERPL-CCNC: 17 U/L — SIGNIFICANT CHANGE UP (ref 10–40)
BILIRUB SERPL-MCNC: 0.3 MG/DL — SIGNIFICANT CHANGE UP (ref 0.2–1.2)
BUN SERPL-MCNC: 12 MG/DL — SIGNIFICANT CHANGE UP (ref 7–23)
CALCIUM SERPL-MCNC: 9.8 MG/DL — SIGNIFICANT CHANGE UP (ref 8.4–10.5)
CHLORIDE SERPL-SCNC: 104 MMOL/L — SIGNIFICANT CHANGE UP (ref 96–108)
CO2 SERPL-SCNC: 25 MMOL/L — SIGNIFICANT CHANGE UP (ref 22–31)
CREAT SERPL-MCNC: 0.78 MG/DL — SIGNIFICANT CHANGE UP (ref 0.5–1.3)
CRP SERPL-MCNC: <3 MG/L — SIGNIFICANT CHANGE UP (ref 0–4)
EGFR: 130 ML/MIN/1.73M2 — SIGNIFICANT CHANGE UP
GLUCOSE SERPL-MCNC: 91 MG/DL — SIGNIFICANT CHANGE UP (ref 70–99)
HCT VFR BLD CALC: 42.5 % — SIGNIFICANT CHANGE UP (ref 39–50)
HGB BLD-MCNC: 14.9 G/DL — SIGNIFICANT CHANGE UP (ref 13–17)
MCHC RBC-ENTMCNC: 30.6 PG — SIGNIFICANT CHANGE UP (ref 27–34)
MCHC RBC-ENTMCNC: 35.1 GM/DL — SIGNIFICANT CHANGE UP (ref 32–36)
MCV RBC AUTO: 87.3 FL — SIGNIFICANT CHANGE UP (ref 80–100)
NRBC # BLD: 0 /100 WBCS — SIGNIFICANT CHANGE UP (ref 0–0)
PLATELET # BLD AUTO: 293 K/UL — SIGNIFICANT CHANGE UP (ref 150–400)
POTASSIUM SERPL-MCNC: 4.1 MMOL/L — SIGNIFICANT CHANGE UP (ref 3.5–5.3)
POTASSIUM SERPL-SCNC: 4.1 MMOL/L — SIGNIFICANT CHANGE UP (ref 3.5–5.3)
PROT SERPL-MCNC: 7.6 G/DL — SIGNIFICANT CHANGE UP (ref 6–8.3)
RBC # BLD: 4.87 M/UL — SIGNIFICANT CHANGE UP (ref 4.2–5.8)
RBC # FLD: 12.3 % — SIGNIFICANT CHANGE UP (ref 10.3–14.5)
SODIUM SERPL-SCNC: 138 MMOL/L — SIGNIFICANT CHANGE UP (ref 135–145)
WBC # BLD: 7.41 K/UL — SIGNIFICANT CHANGE UP (ref 3.8–10.5)
WBC # FLD AUTO: 7.41 K/UL — SIGNIFICANT CHANGE UP (ref 3.8–10.5)

## 2024-07-08 PROCEDURE — 80053 COMPREHEN METABOLIC PANEL: CPT

## 2024-07-08 PROCEDURE — 96365 THER/PROPH/DIAG IV INF INIT: CPT

## 2024-07-08 PROCEDURE — 96413 CHEMO IV INFUSION 1 HR: CPT

## 2024-07-08 PROCEDURE — 85027 COMPLETE CBC AUTOMATED: CPT

## 2024-07-08 PROCEDURE — 82542 COL CHROMOTOGRAPHY QUAL/QUAN: CPT

## 2024-07-08 PROCEDURE — 36415 COLL VENOUS BLD VENIPUNCTURE: CPT

## 2024-07-08 PROCEDURE — 86140 C-REACTIVE PROTEIN: CPT

## 2024-07-08 PROCEDURE — 80230 DRUG ASSAY INFLIXIMAB: CPT

## 2024-07-08 RX ORDER — INFLIXIMAB 100 MG/1
780 INJECTION, POWDER, LYOPHILIZED, FOR SOLUTION INTRAVENOUS ONCE
Refills: 0 | Status: COMPLETED | OUTPATIENT
Start: 2024-07-08 | End: 2024-07-08

## 2024-07-08 RX ADMIN — INFLIXIMAB 206.67 MILLIGRAM(S): 100 INJECTION, POWDER, LYOPHILIZED, FOR SOLUTION INTRAVENOUS at 10:30

## 2024-07-08 RX ADMIN — INFLIXIMAB 780 MILLIGRAM(S): 100 INJECTION, POWDER, LYOPHILIZED, FOR SOLUTION INTRAVENOUS at 11:00

## 2024-08-01 ENCOUNTER — APPOINTMENT (OUTPATIENT)
Dept: OTOLARYNGOLOGY | Facility: CLINIC | Age: 19
End: 2024-08-01

## 2024-08-01 VITALS — HEIGHT: 70 IN | WEIGHT: 160 LBS | BODY MASS INDEX: 22.9 KG/M2

## 2024-08-01 DIAGNOSIS — H93.299 OTHER ABNORMAL AUDITORY PERCEPTIONS, UNSPECIFIED EAR: ICD-10-CM

## 2024-08-01 DIAGNOSIS — H61.23 IMPACTED CERUMEN, BILATERAL: ICD-10-CM

## 2024-08-01 PROCEDURE — 99203 OFFICE O/P NEW LOW 30 MIN: CPT | Mod: 25

## 2024-08-01 PROCEDURE — 69210 REMOVE IMPACTED EAR WAX UNI: CPT

## 2024-08-03 NOTE — HISTORY OF PRESENT ILLNESS
[de-identified] : CC: AU CI   HISTORY OF PRESENT ILLNESS: Mr. Mckeon is a pleasant 19 year old gentleman with AU CI  son of [facility] Neuro-oncologist went swimming and felt ear fullness without pain or drainage there is significant decreased hearing   REVIEW OF SYSTEMS: General ROS: negative for - chills, fatigue or fever Psychological ROS: negative for - anxiety or depression Ophthalmic ROS: negative for - blurry vision, decreased vision or double vision ENT ROS: negative except as noted from HPI Allergy and Immunology ROS: negative except as noted from HPI Hematological and Lymphatic ROS: negative for - bleeding problems Endocrine ROS: negative for - malaise/lethargy Respiratory ROS: negative for - stridor Cardiovascular ROS: negative for - chest pain Gastrointestinal ROS: negative for - appetite loss or nausea/vomiting Genitourinary ROS: negative for - incontinence Musculoskeletal ROS: negative for - gait disturbance Neurological ROS: negative for - behavioral changes Dermatological ROS: negative for - nail changes   Physical Exam:   GENERAL APPEARANCE: Well-developed and No Acute Distress. COMMUNICATION: Able to Communicate. Strong Voice.   HEAD AND FACE Eyes: Testing of ocular motility including primary gaze alignment normal. Inspection and Appearance: No evidence of lesions or masses Palpation: Palpation of the face reveals no sinus tenderness Salivary Glands: Symmetric without masses Facial Strength: Symmetric without evidence of facial paralysis   EAR, NOSE, MOUTH, and THROAT: Ear Canals and Tympanic Membranes, Bilateral: No evidence of inflammation or lesions. Thresholds: Clinical speech reception thresholds normal. External, Nose and Auricle: No lesions or masses.   NECK: Evaluation: No evidence of masses or crepitus. The neck is symmetric and the trachea is in the midline position. Thyroid: No evidence of enlargement, tenderness or mass. Neck Lymph Nodes: WNL. Respiratory: Inspection of the chest including symmetry, expansion and/or assessment of respiratory effort normal. Cardiovascular: Evaluation of peripheral vascular system by observation and palpation of capillary refill, normal. Neurological/Psychiatric: Alert, Oriented, Mood, and Affect Normal.   PROCEDURE: Removal impacted cerumen requiring instrumentation. ([id number])   PREOPERATIVE DIAGNOSIS: Cerumen Impaction   POSTOPERATIVE DIAGNOSIS Dx: Same   INDICATION FOR PROCEDURE: Patient has noted fullness and hearing loss in the affected ears for significant period of time.   EXAM FINDINGS: Auditory canal(s) was obstructed with cerumen.  Cerumen was observed with the microscope after the ear speculum was placed in the EAC, and gently loosened with the cerumen loop circumferentially.  The cerumen was then removed using suction, cerumen loop, and irrigation.  The tympanic membranes are intact following the procedure.  Auditory canals appear minimally inflamed.   Left ear:  CI removed TMI Right ear: CI removed TMI   IMPRESSION: Mr. Mckeon is a pleasant 19 year old gentleman with AU CI s/p removal, symptomatic relief from hearing loss   PLAN: -mineral oil/debrox as needed -RTC PRN     Jonathan Lugo MD [facility] Department of Otolaryngology- Head and Neck Surgery [facility]

## 2024-08-12 ENCOUNTER — APPOINTMENT (OUTPATIENT)
Dept: INFUSION THERAPY | Facility: CLINIC | Age: 19
End: 2024-08-12

## 2024-09-19 RX ORDER — INFLIXIMAB-DYYB 120 MG/ML
780 INJECTION SUBCUTANEOUS ONCE
Refills: 0 | Status: COMPLETED | OUTPATIENT
Start: 2024-11-05 | End: 2024-11-05

## 2024-09-20 ENCOUNTER — OUTPATIENT (OUTPATIENT)
Dept: OUTPATIENT SERVICES | Facility: HOSPITAL | Age: 19
LOS: 1 days | End: 2024-09-20
Payer: COMMERCIAL

## 2024-09-20 ENCOUNTER — APPOINTMENT (OUTPATIENT)
Dept: INFUSION THERAPY | Facility: CLINIC | Age: 19
End: 2024-09-20

## 2024-09-20 VITALS
HEIGHT: 70 IN | WEIGHT: 160.94 LBS | DIASTOLIC BLOOD PRESSURE: 86 MMHG | HEART RATE: 55 BPM | TEMPERATURE: 97 F | RESPIRATION RATE: 16 BRPM | OXYGEN SATURATION: 96 % | SYSTOLIC BLOOD PRESSURE: 140 MMHG

## 2024-09-20 VITALS
RESPIRATION RATE: 16 BRPM | SYSTOLIC BLOOD PRESSURE: 118 MMHG | OXYGEN SATURATION: 99 % | HEART RATE: 64 BPM | TEMPERATURE: 97 F | DIASTOLIC BLOOD PRESSURE: 78 MMHG

## 2024-09-20 DIAGNOSIS — K50.00 CROHN'S DISEASE OF SMALL INTESTINE WITHOUT COMPLICATIONS: ICD-10-CM

## 2024-09-20 LAB
ALBUMIN SERPL ELPH-MCNC: 4.8 G/DL — SIGNIFICANT CHANGE UP (ref 3.3–5)
ALP SERPL-CCNC: 55 U/L — SIGNIFICANT CHANGE UP (ref 40–120)
ALT FLD-CCNC: 14 U/L — SIGNIFICANT CHANGE UP (ref 10–45)
ANION GAP SERPL CALC-SCNC: 10 MMOL/L — SIGNIFICANT CHANGE UP (ref 5–17)
AST SERPL-CCNC: 20 U/L — SIGNIFICANT CHANGE UP (ref 10–40)
BILIRUB SERPL-MCNC: 0.5 MG/DL — SIGNIFICANT CHANGE UP (ref 0.2–1.2)
BUN SERPL-MCNC: 14 MG/DL — SIGNIFICANT CHANGE UP (ref 7–23)
CALCIUM SERPL-MCNC: 10 MG/DL — SIGNIFICANT CHANGE UP (ref 8.4–10.5)
CHLORIDE SERPL-SCNC: 103 MMOL/L — SIGNIFICANT CHANGE UP (ref 96–108)
CO2 SERPL-SCNC: 28 MMOL/L — SIGNIFICANT CHANGE UP (ref 22–31)
CREAT SERPL-MCNC: 0.87 MG/DL — SIGNIFICANT CHANGE UP (ref 0.5–1.3)
CRP SERPL-MCNC: <3 MG/L — SIGNIFICANT CHANGE UP (ref 0–4)
EGFR: 126 ML/MIN/1.73M2 — SIGNIFICANT CHANGE UP
GLUCOSE SERPL-MCNC: 82 MG/DL — SIGNIFICANT CHANGE UP (ref 70–99)
HCT VFR BLD CALC: 44.7 % — SIGNIFICANT CHANGE UP (ref 39–50)
HGB BLD-MCNC: 15.2 G/DL — SIGNIFICANT CHANGE UP (ref 13–17)
MCHC RBC-ENTMCNC: 29.2 PG — SIGNIFICANT CHANGE UP (ref 27–34)
MCHC RBC-ENTMCNC: 34 GM/DL — SIGNIFICANT CHANGE UP (ref 32–36)
MCV RBC AUTO: 86 FL — SIGNIFICANT CHANGE UP (ref 80–100)
NRBC # BLD: 0 /100 WBCS — SIGNIFICANT CHANGE UP (ref 0–0)
PLATELET # BLD AUTO: 324 K/UL — SIGNIFICANT CHANGE UP (ref 150–400)
POTASSIUM SERPL-MCNC: 4.2 MMOL/L — SIGNIFICANT CHANGE UP (ref 3.5–5.3)
POTASSIUM SERPL-SCNC: 4.2 MMOL/L — SIGNIFICANT CHANGE UP (ref 3.5–5.3)
PROT SERPL-MCNC: 7.8 G/DL — SIGNIFICANT CHANGE UP (ref 6–8.3)
RBC # BLD: 5.2 M/UL — SIGNIFICANT CHANGE UP (ref 4.2–5.8)
RBC # FLD: 12 % — SIGNIFICANT CHANGE UP (ref 10.3–14.5)
SODIUM SERPL-SCNC: 141 MMOL/L — SIGNIFICANT CHANGE UP (ref 135–145)
WBC # BLD: 6.5 K/UL — SIGNIFICANT CHANGE UP (ref 3.8–10.5)
WBC # FLD AUTO: 6.5 K/UL — SIGNIFICANT CHANGE UP (ref 3.8–10.5)

## 2024-09-20 PROCEDURE — 80230 DRUG ASSAY INFLIXIMAB: CPT

## 2024-09-20 PROCEDURE — 86140 C-REACTIVE PROTEIN: CPT

## 2024-09-20 PROCEDURE — 82397 CHEMILUMINESCENT ASSAY: CPT

## 2024-09-20 PROCEDURE — 85027 COMPLETE CBC AUTOMATED: CPT

## 2024-09-20 PROCEDURE — 96413 CHEMO IV INFUSION 1 HR: CPT

## 2024-09-20 PROCEDURE — 36415 COLL VENOUS BLD VENIPUNCTURE: CPT

## 2024-09-20 PROCEDURE — 80053 COMPREHEN METABOLIC PANEL: CPT

## 2024-09-20 RX ORDER — INFLIXIMAB 100 MG/10ML
780 INJECTION, POWDER, LYOPHILIZED, FOR SOLUTION INTRAVENOUS ONCE
Refills: 0 | Status: COMPLETED | OUTPATIENT
Start: 2024-09-20 | End: 2024-09-20

## 2024-09-20 RX ADMIN — INFLIXIMAB 206.67 MILLIGRAM(S): 100 INJECTION, POWDER, LYOPHILIZED, FOR SOLUTION INTRAVENOUS at 10:49

## 2024-09-20 RX ADMIN — INFLIXIMAB 780 MILLIGRAM(S): 100 INJECTION, POWDER, LYOPHILIZED, FOR SOLUTION INTRAVENOUS at 12:17

## 2024-10-01 LAB
INFLIXIMAB AB SERPL-MCNC: <22 NG/ML — SIGNIFICANT CHANGE UP
INFLIXIMAB SERPL-MCNC: 11 UG/ML — SIGNIFICANT CHANGE UP

## 2024-10-25 ENCOUNTER — APPOINTMENT (OUTPATIENT)
Dept: INFUSION THERAPY | Facility: CLINIC | Age: 19
End: 2024-10-25

## 2024-10-25 ENCOUNTER — OUTPATIENT (OUTPATIENT)
Dept: OUTPATIENT SERVICES | Facility: HOSPITAL | Age: 19
LOS: 1 days | End: 2024-10-25

## 2024-10-25 DIAGNOSIS — K50.00 CROHN'S DISEASE OF SMALL INTESTINE WITHOUT COMPLICATIONS: ICD-10-CM

## 2024-10-31 ENCOUNTER — OUTPATIENT (OUTPATIENT)
Dept: OUTPATIENT SERVICES | Facility: HOSPITAL | Age: 19
LOS: 1 days | End: 2024-10-31

## 2024-10-31 DIAGNOSIS — K50.90 CROHN'S DISEASE, UNSPECIFIED, WITHOUT COMPLICATIONS: ICD-10-CM

## 2024-11-05 ENCOUNTER — OUTPATIENT (OUTPATIENT)
Dept: OUTPATIENT SERVICES | Facility: HOSPITAL | Age: 19
LOS: 1 days | End: 2024-11-05
Payer: COMMERCIAL

## 2024-11-05 ENCOUNTER — APPOINTMENT (OUTPATIENT)
Dept: INFUSION THERAPY | Facility: CLINIC | Age: 19
End: 2024-11-05

## 2024-11-05 VITALS
DIASTOLIC BLOOD PRESSURE: 78 MMHG | WEIGHT: 166.89 LBS | SYSTOLIC BLOOD PRESSURE: 127 MMHG | HEIGHT: 70 IN | HEART RATE: 77 BPM | OXYGEN SATURATION: 98 % | RESPIRATION RATE: 16 BRPM | TEMPERATURE: 98 F

## 2024-11-05 VITALS
OXYGEN SATURATION: 98 % | DIASTOLIC BLOOD PRESSURE: 69 MMHG | TEMPERATURE: 98 F | HEART RATE: 70 BPM | SYSTOLIC BLOOD PRESSURE: 134 MMHG | RESPIRATION RATE: 16 BRPM

## 2024-11-05 DIAGNOSIS — K50.00 CROHN'S DISEASE OF SMALL INTESTINE WITHOUT COMPLICATIONS: ICD-10-CM

## 2024-11-05 LAB
ALBUMIN SERPL ELPH-MCNC: 4.5 G/DL — SIGNIFICANT CHANGE UP (ref 3.3–5)
ALP SERPL-CCNC: 56 U/L — SIGNIFICANT CHANGE UP (ref 40–120)
ALT FLD-CCNC: 21 U/L — SIGNIFICANT CHANGE UP (ref 10–45)
ANION GAP SERPL CALC-SCNC: 11 MMOL/L — SIGNIFICANT CHANGE UP (ref 5–17)
AST SERPL-CCNC: 30 U/L — SIGNIFICANT CHANGE UP (ref 10–40)
BILIRUB SERPL-MCNC: 0.3 MG/DL — SIGNIFICANT CHANGE UP (ref 0.2–1.2)
BUN SERPL-MCNC: 18 MG/DL — SIGNIFICANT CHANGE UP (ref 7–23)
CALCIUM SERPL-MCNC: 9.3 MG/DL — SIGNIFICANT CHANGE UP (ref 8.4–10.5)
CHLORIDE SERPL-SCNC: 101 MMOL/L — SIGNIFICANT CHANGE UP (ref 96–108)
CO2 SERPL-SCNC: 24 MMOL/L — SIGNIFICANT CHANGE UP (ref 22–31)
CREAT SERPL-MCNC: 0.93 MG/DL — SIGNIFICANT CHANGE UP (ref 0.5–1.3)
CRP SERPL-MCNC: <3 MG/L — SIGNIFICANT CHANGE UP (ref 0–4)
EGFR: 120 ML/MIN/1.73M2 — SIGNIFICANT CHANGE UP
GLUCOSE SERPL-MCNC: 120 MG/DL — HIGH (ref 70–99)
HCT VFR BLD CALC: 41.6 % — SIGNIFICANT CHANGE UP (ref 39–50)
HGB BLD-MCNC: 14.1 G/DL — SIGNIFICANT CHANGE UP (ref 13–17)
MCHC RBC-ENTMCNC: 29.3 PG — SIGNIFICANT CHANGE UP (ref 27–34)
MCHC RBC-ENTMCNC: 33.9 G/DL — SIGNIFICANT CHANGE UP (ref 32–36)
MCV RBC AUTO: 86.3 FL — SIGNIFICANT CHANGE UP (ref 80–100)
NRBC # BLD: 0 /100 WBCS — SIGNIFICANT CHANGE UP (ref 0–0)
PLATELET # BLD AUTO: 327 K/UL — SIGNIFICANT CHANGE UP (ref 150–400)
POTASSIUM SERPL-MCNC: 4.1 MMOL/L — SIGNIFICANT CHANGE UP (ref 3.5–5.3)
POTASSIUM SERPL-SCNC: 4.1 MMOL/L — SIGNIFICANT CHANGE UP (ref 3.5–5.3)
PROT SERPL-MCNC: 7.6 G/DL — SIGNIFICANT CHANGE UP (ref 6–8.3)
RBC # BLD: 4.82 M/UL — SIGNIFICANT CHANGE UP (ref 4.2–5.8)
RBC # FLD: 12.2 % — SIGNIFICANT CHANGE UP (ref 10.3–14.5)
SODIUM SERPL-SCNC: 136 MMOL/L — SIGNIFICANT CHANGE UP (ref 135–145)
WBC # BLD: 7.89 K/UL — SIGNIFICANT CHANGE UP (ref 3.8–10.5)
WBC # FLD AUTO: 7.89 K/UL — SIGNIFICANT CHANGE UP (ref 3.8–10.5)

## 2024-11-05 PROCEDURE — 85027 COMPLETE CBC AUTOMATED: CPT

## 2024-11-05 PROCEDURE — 36415 COLL VENOUS BLD VENIPUNCTURE: CPT

## 2024-11-05 PROCEDURE — 80053 COMPREHEN METABOLIC PANEL: CPT

## 2024-11-05 PROCEDURE — 96415 CHEMO IV INFUSION ADDL HR: CPT

## 2024-11-05 PROCEDURE — 82542 COL CHROMOTOGRAPHY QUAL/QUAN: CPT

## 2024-11-05 PROCEDURE — 96413 CHEMO IV INFUSION 1 HR: CPT

## 2024-11-05 PROCEDURE — 80230 DRUG ASSAY INFLIXIMAB: CPT

## 2024-11-05 PROCEDURE — 86140 C-REACTIVE PROTEIN: CPT

## 2024-11-05 RX ADMIN — INFLIXIMAB-DYYB 780 MILLIGRAM(S): 120 INJECTION SUBCUTANEOUS at 18:41

## 2024-11-05 RX ADMIN — INFLIXIMAB-DYYB 206.67 MILLIGRAM(S): 120 INJECTION SUBCUTANEOUS at 16:45

## 2024-11-08 ENCOUNTER — TRANSCRIPTION ENCOUNTER (OUTPATIENT)
Age: 19
End: 2024-11-08

## 2024-11-08 LAB
ANTIBODIES TO INFLIXIMAB (ATI) CONCENTRATION: < 3.1 U/ML — SIGNIFICANT CHANGE UP
PROMETHEUS ANSER IFX RESULT: SIGNIFICANT CHANGE UP
PROMETHEUS LABORATORY FOOTER: SIGNIFICANT CHANGE UP
SERUM INFLIXIMAB (IFX) CONCENTRATION: 5.4 UG/ML — SIGNIFICANT CHANGE UP

## 2024-12-11 ENCOUNTER — OUTPATIENT (OUTPATIENT)
Dept: OUTPATIENT SERVICES | Facility: HOSPITAL | Age: 19
LOS: 1 days | End: 2024-12-11
Payer: COMMERCIAL

## 2024-12-11 ENCOUNTER — APPOINTMENT (OUTPATIENT)
Dept: INFUSION THERAPY | Facility: CLINIC | Age: 19
End: 2024-12-11

## 2024-12-11 VITALS
OXYGEN SATURATION: 97 % | RESPIRATION RATE: 16 BRPM | SYSTOLIC BLOOD PRESSURE: 136 MMHG | TEMPERATURE: 98 F | HEART RATE: 57 BPM | DIASTOLIC BLOOD PRESSURE: 84 MMHG

## 2024-12-11 VITALS
TEMPERATURE: 98 F | WEIGHT: 169.98 LBS | HEIGHT: 70 IN | SYSTOLIC BLOOD PRESSURE: 135 MMHG | HEART RATE: 81 BPM | DIASTOLIC BLOOD PRESSURE: 80 MMHG | OXYGEN SATURATION: 98 % | RESPIRATION RATE: 16 BRPM

## 2024-12-11 DIAGNOSIS — K50.00 CROHN'S DISEASE OF SMALL INTESTINE WITHOUT COMPLICATIONS: ICD-10-CM

## 2024-12-11 LAB
ALBUMIN SERPL ELPH-MCNC: 4.7 G/DL — SIGNIFICANT CHANGE UP (ref 3.3–5)
ALP SERPL-CCNC: 59 U/L — SIGNIFICANT CHANGE UP (ref 40–120)
ALT FLD-CCNC: 18 U/L — SIGNIFICANT CHANGE UP (ref 10–45)
ANION GAP SERPL CALC-SCNC: 9 MMOL/L — SIGNIFICANT CHANGE UP (ref 5–17)
AST SERPL-CCNC: 23 U/L — SIGNIFICANT CHANGE UP (ref 10–40)
BILIRUB SERPL-MCNC: 0.3 MG/DL — SIGNIFICANT CHANGE UP (ref 0.2–1.2)
BUN SERPL-MCNC: 18 MG/DL — SIGNIFICANT CHANGE UP (ref 7–23)
CALCIUM SERPL-MCNC: 9.3 MG/DL — SIGNIFICANT CHANGE UP (ref 8.4–10.5)
CHLORIDE SERPL-SCNC: 103 MMOL/L — SIGNIFICANT CHANGE UP (ref 96–108)
CO2 SERPL-SCNC: 25 MMOL/L — SIGNIFICANT CHANGE UP (ref 22–31)
CREAT SERPL-MCNC: 0.81 MG/DL — SIGNIFICANT CHANGE UP (ref 0.5–1.3)
CRP SERPL-MCNC: <3 MG/L — SIGNIFICANT CHANGE UP (ref 0–4)
EGFR: 129 ML/MIN/1.73M2 — SIGNIFICANT CHANGE UP
GLUCOSE SERPL-MCNC: 91 MG/DL — SIGNIFICANT CHANGE UP (ref 70–99)
HCT VFR BLD CALC: 41.1 % — SIGNIFICANT CHANGE UP (ref 39–50)
HGB BLD-MCNC: 14 G/DL — SIGNIFICANT CHANGE UP (ref 13–17)
MCHC RBC-ENTMCNC: 29.8 PG — SIGNIFICANT CHANGE UP (ref 27–34)
MCHC RBC-ENTMCNC: 34.1 G/DL — SIGNIFICANT CHANGE UP (ref 32–36)
MCV RBC AUTO: 87.4 FL — SIGNIFICANT CHANGE UP (ref 80–100)
NRBC # BLD: 0 /100 WBCS — SIGNIFICANT CHANGE UP (ref 0–0)
PLATELET # BLD AUTO: 323 K/UL — SIGNIFICANT CHANGE UP (ref 150–400)
POTASSIUM SERPL-MCNC: 4 MMOL/L — SIGNIFICANT CHANGE UP (ref 3.5–5.3)
POTASSIUM SERPL-SCNC: 4 MMOL/L — SIGNIFICANT CHANGE UP (ref 3.5–5.3)
PROT SERPL-MCNC: 7.3 G/DL — SIGNIFICANT CHANGE UP (ref 6–8.3)
RBC # BLD: 4.7 M/UL — SIGNIFICANT CHANGE UP (ref 4.2–5.8)
RBC # FLD: 12.1 % — SIGNIFICANT CHANGE UP (ref 10.3–14.5)
SODIUM SERPL-SCNC: 137 MMOL/L — SIGNIFICANT CHANGE UP (ref 135–145)
WBC # BLD: 8.07 K/UL — SIGNIFICANT CHANGE UP (ref 3.8–10.5)
WBC # FLD AUTO: 8.07 K/UL — SIGNIFICANT CHANGE UP (ref 3.8–10.5)

## 2024-12-11 PROCEDURE — 96413 CHEMO IV INFUSION 1 HR: CPT

## 2024-12-11 PROCEDURE — 36415 COLL VENOUS BLD VENIPUNCTURE: CPT

## 2024-12-11 PROCEDURE — 82542 COL CHROMOTOGRAPHY QUAL/QUAN: CPT

## 2024-12-11 PROCEDURE — 80053 COMPREHEN METABOLIC PANEL: CPT

## 2024-12-11 PROCEDURE — 86140 C-REACTIVE PROTEIN: CPT

## 2024-12-11 PROCEDURE — 80230 DRUG ASSAY INFLIXIMAB: CPT

## 2024-12-11 PROCEDURE — 85027 COMPLETE CBC AUTOMATED: CPT

## 2024-12-11 RX ORDER — INFLIXIMAB 100 MG/10ML
780 INJECTION, POWDER, LYOPHILIZED, FOR SOLUTION INTRAVENOUS ONCE
Refills: 0 | Status: COMPLETED | OUTPATIENT
Start: 2024-12-11 | End: 2024-12-11

## 2024-12-11 RX ADMIN — INFLIXIMAB 155 MILLIGRAM(S): 100 INJECTION, POWDER, LYOPHILIZED, FOR SOLUTION INTRAVENOUS at 16:40

## 2024-12-11 RX ADMIN — INFLIXIMAB 780 MILLIGRAM(S): 100 INJECTION, POWDER, LYOPHILIZED, FOR SOLUTION INTRAVENOUS at 18:12

## 2024-12-27 ENCOUNTER — TRANSCRIPTION ENCOUNTER (OUTPATIENT)
Age: 19
End: 2024-12-27

## 2025-01-08 NOTE — PHARMACY COMMUNICATION NOTE - COMMENTS
I requested a new order for this patient and saw that the patient needed to have this Remicade infusion 1 week earlier than the 5 weeks he normally receives because he didn't want to get sick on his trip on [place]. 10, 2025. WILIAM Nick said that she wanted to see the patient before writing a new order. We received a new order for Remicade and WILIAM Nick approved giving the Remicade 1 week early because of the vacation .

## 2025-01-09 ENCOUNTER — APPOINTMENT (OUTPATIENT)
Dept: GASTROENTEROLOGY | Facility: CLINIC | Age: 20
End: 2025-01-09
Payer: COMMERCIAL

## 2025-01-09 ENCOUNTER — OUTPATIENT (OUTPATIENT)
Dept: OUTPATIENT SERVICES | Facility: HOSPITAL | Age: 20
LOS: 1 days | End: 2025-01-09
Payer: COMMERCIAL

## 2025-01-09 ENCOUNTER — APPOINTMENT (OUTPATIENT)
Dept: INFUSION THERAPY | Facility: CLINIC | Age: 20
End: 2025-01-09

## 2025-01-09 ENCOUNTER — NON-APPOINTMENT (OUTPATIENT)
Age: 20
End: 2025-01-09

## 2025-01-09 VITALS
OXYGEN SATURATION: 98 % | SYSTOLIC BLOOD PRESSURE: 152 MMHG | DIASTOLIC BLOOD PRESSURE: 70 MMHG | RESPIRATION RATE: 18 BRPM | HEART RATE: 60 BPM | TEMPERATURE: 98 F

## 2025-01-09 VITALS
HEIGHT: 70 IN | OXYGEN SATURATION: 99 % | SYSTOLIC BLOOD PRESSURE: 150 MMHG | TEMPERATURE: 95.7 F | RESPIRATION RATE: 14 BRPM | BODY MASS INDEX: 24.77 KG/M2 | DIASTOLIC BLOOD PRESSURE: 90 MMHG | WEIGHT: 173 LBS | HEART RATE: 76 BPM

## 2025-01-09 VITALS
DIASTOLIC BLOOD PRESSURE: 63 MMHG | WEIGHT: 169.98 LBS | HEIGHT: 70 IN | SYSTOLIC BLOOD PRESSURE: 140 MMHG | TEMPERATURE: 97 F | RESPIRATION RATE: 17 BRPM | HEART RATE: 71 BPM | OXYGEN SATURATION: 99 %

## 2025-01-09 DIAGNOSIS — K50.00 CROHN'S DISEASE OF SMALL INTESTINE WITHOUT COMPLICATIONS: ICD-10-CM

## 2025-01-09 DIAGNOSIS — K50.00 CROHN'S DISEASE OF SMALL INTESTINE W/OUT COMPLICATIONS: ICD-10-CM

## 2025-01-09 DIAGNOSIS — Z79.620 LONG TERM (CURRENT) USE OF IMMUNOSUPPRESSIVE BIOLOGIC: ICD-10-CM

## 2025-01-09 DIAGNOSIS — R69 ILLNESS, UNSPECIFIED: ICD-10-CM

## 2025-01-09 DIAGNOSIS — Z51.81 ENCOUNTER FOR THERAPEUTIC DRUG LVL MONITORING: ICD-10-CM

## 2025-01-09 LAB
ALBUMIN SERPL ELPH-MCNC: 5 G/DL — SIGNIFICANT CHANGE UP (ref 3.3–5)
ALP SERPL-CCNC: 62 U/L — SIGNIFICANT CHANGE UP (ref 40–120)
ALT FLD-CCNC: 20 U/L — SIGNIFICANT CHANGE UP (ref 10–45)
ANION GAP SERPL CALC-SCNC: 15 MMOL/L — SIGNIFICANT CHANGE UP (ref 5–17)
AST SERPL-CCNC: 25 U/L — SIGNIFICANT CHANGE UP (ref 10–40)
BILIRUB SERPL-MCNC: 0.4 MG/DL — SIGNIFICANT CHANGE UP (ref 0.2–1.2)
BUN SERPL-MCNC: 12 MG/DL — SIGNIFICANT CHANGE UP (ref 7–23)
CALCIUM SERPL-MCNC: 9.9 MG/DL — SIGNIFICANT CHANGE UP (ref 8.4–10.5)
CHLORIDE SERPL-SCNC: 102 MMOL/L — SIGNIFICANT CHANGE UP (ref 96–108)
CO2 SERPL-SCNC: 24 MMOL/L — SIGNIFICANT CHANGE UP (ref 22–31)
CREAT SERPL-MCNC: 0.75 MG/DL — SIGNIFICANT CHANGE UP (ref 0.5–1.3)
CRP SERPL-MCNC: <3 MG/L — SIGNIFICANT CHANGE UP (ref 0–4)
EGFR: 132 ML/MIN/1.73M2 — SIGNIFICANT CHANGE UP
GLUCOSE SERPL-MCNC: 107 MG/DL — HIGH (ref 70–99)
HCT VFR BLD CALC: 43.5 % — SIGNIFICANT CHANGE UP (ref 39–50)
HGB BLD-MCNC: 15.4 G/DL — SIGNIFICANT CHANGE UP (ref 13–17)
MCHC RBC-ENTMCNC: 30.6 PG — SIGNIFICANT CHANGE UP (ref 27–34)
MCHC RBC-ENTMCNC: 35.4 G/DL — SIGNIFICANT CHANGE UP (ref 32–36)
MCV RBC AUTO: 86.3 FL — SIGNIFICANT CHANGE UP (ref 80–100)
NRBC # BLD: 0 /100 WBCS — SIGNIFICANT CHANGE UP (ref 0–0)
PLATELET # BLD AUTO: 302 K/UL — SIGNIFICANT CHANGE UP (ref 150–400)
POTASSIUM SERPL-MCNC: 3.9 MMOL/L — SIGNIFICANT CHANGE UP (ref 3.5–5.3)
POTASSIUM SERPL-SCNC: 3.9 MMOL/L — SIGNIFICANT CHANGE UP (ref 3.5–5.3)
PROT SERPL-MCNC: 7.7 G/DL — SIGNIFICANT CHANGE UP (ref 6–8.3)
RBC # BLD: 5.04 M/UL — SIGNIFICANT CHANGE UP (ref 4.2–5.8)
RBC # FLD: 12.4 % — SIGNIFICANT CHANGE UP (ref 10.3–14.5)
SODIUM SERPL-SCNC: 141 MMOL/L — SIGNIFICANT CHANGE UP (ref 135–145)
WBC # BLD: 7.3 K/UL — SIGNIFICANT CHANGE UP (ref 3.8–10.5)
WBC # FLD AUTO: 7.3 K/UL — SIGNIFICANT CHANGE UP (ref 3.8–10.5)

## 2025-01-09 PROCEDURE — 90686 IIV4 VACC NO PRSV 0.5 ML IM: CPT

## 2025-01-09 PROCEDURE — 85027 COMPLETE CBC AUTOMATED: CPT

## 2025-01-09 PROCEDURE — 99213 OFFICE O/P EST LOW 20 MIN: CPT | Mod: 25

## 2025-01-09 PROCEDURE — 80053 COMPREHEN METABOLIC PANEL: CPT

## 2025-01-09 PROCEDURE — G0008: CPT

## 2025-01-09 PROCEDURE — 96413 CHEMO IV INFUSION 1 HR: CPT

## 2025-01-09 PROCEDURE — 96415 CHEMO IV INFUSION ADDL HR: CPT

## 2025-01-09 PROCEDURE — 86140 C-REACTIVE PROTEIN: CPT

## 2025-01-09 PROCEDURE — 36415 COLL VENOUS BLD VENIPUNCTURE: CPT

## 2025-01-09 RX ORDER — INFLIXIMAB-DYYB 120 MG/ML
780 INJECTION SUBCUTANEOUS ONCE
Refills: 0 | Status: COMPLETED | OUTPATIENT
Start: 2025-01-09 | End: 2025-01-09

## 2025-01-09 RX ORDER — SERTRALINE HYDROCHLORIDE 50 MG/1
50 TABLET, FILM COATED ORAL
Refills: 0 | Status: ACTIVE | COMMUNITY

## 2025-01-09 RX ADMIN — INFLIXIMAB-DYYB 780 MILLIGRAM(S): 120 INJECTION SUBCUTANEOUS at 18:01

## 2025-01-09 RX ADMIN — INFLIXIMAB-DYYB 155 MILLIGRAM(S): 120 INJECTION SUBCUTANEOUS at 16:21

## 2025-01-10 PROBLEM — Z79.620 INFLIXIMAB (REMICADE) LONG-TERM USE: Status: ACTIVE | Noted: 2019-05-24

## 2025-02-14 ENCOUNTER — OUTPATIENT (OUTPATIENT)
Dept: OUTPATIENT SERVICES | Facility: HOSPITAL | Age: 20
LOS: 1 days | End: 2025-02-14
Payer: COMMERCIAL

## 2025-02-14 ENCOUNTER — APPOINTMENT (OUTPATIENT)
Dept: INFUSION THERAPY | Facility: CLINIC | Age: 20
End: 2025-02-14

## 2025-02-14 VITALS
OXYGEN SATURATION: 97 % | RESPIRATION RATE: 18 BRPM | HEART RATE: 72 BPM | DIASTOLIC BLOOD PRESSURE: 70 MMHG | SYSTOLIC BLOOD PRESSURE: 130 MMHG | TEMPERATURE: 98 F

## 2025-02-14 VITALS
TEMPERATURE: 97 F | RESPIRATION RATE: 18 BRPM | WEIGHT: 167.99 LBS | SYSTOLIC BLOOD PRESSURE: 127 MMHG | DIASTOLIC BLOOD PRESSURE: 64 MMHG | HEIGHT: 70 IN | OXYGEN SATURATION: 97 % | HEART RATE: 83 BPM

## 2025-02-14 DIAGNOSIS — K50.00 CROHN'S DISEASE OF SMALL INTESTINE WITHOUT COMPLICATIONS: ICD-10-CM

## 2025-02-14 LAB
ALBUMIN SERPL ELPH-MCNC: 4.8 G/DL — SIGNIFICANT CHANGE UP (ref 3.3–5)
ALP SERPL-CCNC: 59 U/L — SIGNIFICANT CHANGE UP (ref 40–120)
ALT FLD-CCNC: 17 U/L — SIGNIFICANT CHANGE UP (ref 10–45)
ANION GAP SERPL CALC-SCNC: 12 MMOL/L — SIGNIFICANT CHANGE UP (ref 5–17)
AST SERPL-CCNC: 22 U/L — SIGNIFICANT CHANGE UP (ref 10–40)
BILIRUB SERPL-MCNC: 0.4 MG/DL — SIGNIFICANT CHANGE UP (ref 0.2–1.2)
BUN SERPL-MCNC: 14 MG/DL — SIGNIFICANT CHANGE UP (ref 7–23)
CALCIUM SERPL-MCNC: 9.4 MG/DL — SIGNIFICANT CHANGE UP (ref 8.4–10.5)
CHLORIDE SERPL-SCNC: 105 MMOL/L — SIGNIFICANT CHANGE UP (ref 96–108)
CO2 SERPL-SCNC: 23 MMOL/L — SIGNIFICANT CHANGE UP (ref 22–31)
CREAT SERPL-MCNC: 0.81 MG/DL — SIGNIFICANT CHANGE UP (ref 0.5–1.3)
CRP SERPL-MCNC: <3 MG/L — SIGNIFICANT CHANGE UP (ref 0–4)
EGFR: 129 ML/MIN/1.73M2 — SIGNIFICANT CHANGE UP
GLUCOSE SERPL-MCNC: 106 MG/DL — HIGH (ref 70–99)
HCT VFR BLD CALC: 42.2 % — SIGNIFICANT CHANGE UP (ref 39–50)
HGB BLD-MCNC: 14.8 G/DL — SIGNIFICANT CHANGE UP (ref 13–17)
MCHC RBC-ENTMCNC: 30.5 PG — SIGNIFICANT CHANGE UP (ref 27–34)
MCHC RBC-ENTMCNC: 35.1 G/DL — SIGNIFICANT CHANGE UP (ref 32–36)
MCV RBC AUTO: 86.8 FL — SIGNIFICANT CHANGE UP (ref 80–100)
NRBC BLD AUTO-RTO: 0 /100 WBCS — SIGNIFICANT CHANGE UP (ref 0–0)
PLATELET # BLD AUTO: 336 K/UL — SIGNIFICANT CHANGE UP (ref 150–400)
POTASSIUM SERPL-MCNC: 4 MMOL/L — SIGNIFICANT CHANGE UP (ref 3.5–5.3)
POTASSIUM SERPL-SCNC: 4 MMOL/L — SIGNIFICANT CHANGE UP (ref 3.5–5.3)
PROT SERPL-MCNC: 7.6 G/DL — SIGNIFICANT CHANGE UP (ref 6–8.3)
RBC # BLD: 4.86 M/UL — SIGNIFICANT CHANGE UP (ref 4.2–5.8)
RBC # FLD: 12.2 % — SIGNIFICANT CHANGE UP (ref 10.3–14.5)
SODIUM SERPL-SCNC: 140 MMOL/L — SIGNIFICANT CHANGE UP (ref 135–145)
WBC # BLD: 8.13 K/UL — SIGNIFICANT CHANGE UP (ref 3.8–10.5)
WBC # FLD AUTO: 8.13 K/UL — SIGNIFICANT CHANGE UP (ref 3.8–10.5)

## 2025-02-14 PROCEDURE — 85027 COMPLETE CBC AUTOMATED: CPT

## 2025-02-14 PROCEDURE — 86140 C-REACTIVE PROTEIN: CPT

## 2025-02-14 PROCEDURE — 96413 CHEMO IV INFUSION 1 HR: CPT

## 2025-02-14 PROCEDURE — 80053 COMPREHEN METABOLIC PANEL: CPT

## 2025-02-14 PROCEDURE — 96415 CHEMO IV INFUSION ADDL HR: CPT

## 2025-02-14 PROCEDURE — 36415 COLL VENOUS BLD VENIPUNCTURE: CPT

## 2025-02-14 RX ORDER — INFLIXIMAB 100 MG/10ML
780 INJECTION, POWDER, LYOPHILIZED, FOR SOLUTION INTRAVENOUS ONCE
Refills: 0 | Status: COMPLETED | OUTPATIENT
Start: 2025-02-14 | End: 2025-02-14

## 2025-02-14 RX ADMIN — INFLIXIMAB 125 MILLIGRAM(S): 100 INJECTION, POWDER, LYOPHILIZED, FOR SOLUTION INTRAVENOUS at 14:50

## 2025-02-14 RX ADMIN — INFLIXIMAB 780 MILLIGRAM(S): 100 INJECTION, POWDER, LYOPHILIZED, FOR SOLUTION INTRAVENOUS at 16:30

## 2025-03-21 ENCOUNTER — APPOINTMENT (OUTPATIENT)
Dept: INFUSION THERAPY | Facility: CLINIC | Age: 20
End: 2025-03-21

## 2025-03-21 ENCOUNTER — OUTPATIENT (OUTPATIENT)
Dept: OUTPATIENT SERVICES | Facility: HOSPITAL | Age: 20
LOS: 1 days | End: 2025-03-21
Payer: COMMERCIAL

## 2025-03-21 VITALS
OXYGEN SATURATION: 99 % | SYSTOLIC BLOOD PRESSURE: 130 MMHG | RESPIRATION RATE: 20 BRPM | TEMPERATURE: 99 F | DIASTOLIC BLOOD PRESSURE: 63 MMHG | HEART RATE: 59 BPM

## 2025-03-21 VITALS
HEIGHT: 66 IN | RESPIRATION RATE: 20 BRPM | HEART RATE: 75 BPM | DIASTOLIC BLOOD PRESSURE: 89 MMHG | SYSTOLIC BLOOD PRESSURE: 156 MMHG | WEIGHT: 167.99 LBS | TEMPERATURE: 98 F | OXYGEN SATURATION: 98 %

## 2025-03-21 DIAGNOSIS — K50.00 CROHN'S DISEASE OF SMALL INTESTINE WITHOUT COMPLICATIONS: ICD-10-CM

## 2025-03-21 LAB
ALBUMIN SERPL ELPH-MCNC: 4.4 G/DL — SIGNIFICANT CHANGE UP (ref 3.3–5)
ALP SERPL-CCNC: 53 U/L — SIGNIFICANT CHANGE UP (ref 40–120)
ALT FLD-CCNC: 17 U/L — SIGNIFICANT CHANGE UP (ref 10–45)
ANION GAP SERPL CALC-SCNC: 14 MMOL/L — SIGNIFICANT CHANGE UP (ref 5–17)
AST SERPL-CCNC: 33 U/L — SIGNIFICANT CHANGE UP (ref 10–40)
BILIRUB SERPL-MCNC: 0.3 MG/DL — SIGNIFICANT CHANGE UP (ref 0.2–1.2)
BUN SERPL-MCNC: 11 MG/DL — SIGNIFICANT CHANGE UP (ref 7–23)
CHLORIDE SERPL-SCNC: 104 MMOL/L — SIGNIFICANT CHANGE UP (ref 96–108)
CO2 SERPL-SCNC: 21 MMOL/L — LOW (ref 22–31)
CREAT SERPL-MCNC: 0.79 MG/DL — SIGNIFICANT CHANGE UP (ref 0.5–1.3)
CRP SERPL-MCNC: <3 MG/L — SIGNIFICANT CHANGE UP (ref 0–4)
EGFR: 129 ML/MIN/1.73M2 — SIGNIFICANT CHANGE UP
EGFR: 129 ML/MIN/1.73M2 — SIGNIFICANT CHANGE UP
GLUCOSE SERPL-MCNC: 90 MG/DL — SIGNIFICANT CHANGE UP (ref 70–99)
POTASSIUM SERPL-MCNC: 4.3 MMOL/L — SIGNIFICANT CHANGE UP (ref 3.5–5.3)
POTASSIUM SERPL-SCNC: 4.3 MMOL/L — SIGNIFICANT CHANGE UP (ref 3.5–5.3)
PROT SERPL-MCNC: 7.3 G/DL — SIGNIFICANT CHANGE UP (ref 6–8.3)
SODIUM SERPL-SCNC: 139 MMOL/L — SIGNIFICANT CHANGE UP (ref 135–145)

## 2025-03-21 PROCEDURE — 80053 COMPREHEN METABOLIC PANEL: CPT

## 2025-03-21 PROCEDURE — 96413 CHEMO IV INFUSION 1 HR: CPT

## 2025-03-21 PROCEDURE — 86140 C-REACTIVE PROTEIN: CPT

## 2025-03-21 PROCEDURE — 36415 COLL VENOUS BLD VENIPUNCTURE: CPT

## 2025-03-21 RX ORDER — INFLIXIMAB-DYYB 120 MG/ML
780 INJECTION SUBCUTANEOUS ONCE
Refills: 0 | Status: COMPLETED | OUTPATIENT
Start: 2025-03-21 | End: 2025-03-21

## 2025-03-21 RX ADMIN — INFLIXIMAB-DYYB 125 MILLIGRAM(S): 120 INJECTION SUBCUTANEOUS at 12:48

## 2025-03-21 RX ADMIN — INFLIXIMAB-DYYB 780 MILLIGRAM(S): 120 INJECTION SUBCUTANEOUS at 14:30

## 2025-04-25 ENCOUNTER — OUTPATIENT (OUTPATIENT)
Dept: OUTPATIENT SERVICES | Facility: HOSPITAL | Age: 20
LOS: 1 days | End: 2025-04-25
Payer: COMMERCIAL

## 2025-04-25 ENCOUNTER — APPOINTMENT (OUTPATIENT)
Dept: INFUSION THERAPY | Facility: CLINIC | Age: 20
End: 2025-04-25

## 2025-04-25 VITALS
HEIGHT: 70 IN | RESPIRATION RATE: 17 BRPM | WEIGHT: 166.01 LBS | OXYGEN SATURATION: 99 % | TEMPERATURE: 97 F | HEART RATE: 69 BPM | SYSTOLIC BLOOD PRESSURE: 132 MMHG | DIASTOLIC BLOOD PRESSURE: 83 MMHG

## 2025-04-25 VITALS
OXYGEN SATURATION: 99 % | TEMPERATURE: 98 F | DIASTOLIC BLOOD PRESSURE: 74 MMHG | RESPIRATION RATE: 18 BRPM | SYSTOLIC BLOOD PRESSURE: 125 MMHG | HEART RATE: 72 BPM

## 2025-04-25 LAB
ALBUMIN SERPL ELPH-MCNC: 4.6 G/DL — SIGNIFICANT CHANGE UP (ref 3.3–5)
ALP SERPL-CCNC: 55 U/L — SIGNIFICANT CHANGE UP (ref 40–120)
ALT FLD-CCNC: 15 U/L — SIGNIFICANT CHANGE UP (ref 10–45)
ANION GAP SERPL CALC-SCNC: 13 MMOL/L — SIGNIFICANT CHANGE UP (ref 5–17)
AST SERPL-CCNC: 19 U/L — SIGNIFICANT CHANGE UP (ref 10–40)
BILIRUB SERPL-MCNC: 0.3 MG/DL — SIGNIFICANT CHANGE UP (ref 0.2–1.2)
BUN SERPL-MCNC: 11 MG/DL — SIGNIFICANT CHANGE UP (ref 7–23)
CALCIUM SERPL-MCNC: 9.5 MG/DL — SIGNIFICANT CHANGE UP (ref 8.4–10.5)
CHLORIDE SERPL-SCNC: 101 MMOL/L — SIGNIFICANT CHANGE UP (ref 96–108)
CO2 SERPL-SCNC: 26 MMOL/L — SIGNIFICANT CHANGE UP (ref 22–31)
CREAT SERPL-MCNC: 0.81 MG/DL — SIGNIFICANT CHANGE UP (ref 0.5–1.3)
CRP SERPL-MCNC: <3 MG/L — SIGNIFICANT CHANGE UP (ref 0–4)
EGFR: 128 ML/MIN/1.73M2 — SIGNIFICANT CHANGE UP
EGFR: 128 ML/MIN/1.73M2 — SIGNIFICANT CHANGE UP
GLUCOSE SERPL-MCNC: 87 MG/DL — SIGNIFICANT CHANGE UP (ref 70–99)
HCT VFR BLD CALC: 40.4 % — SIGNIFICANT CHANGE UP (ref 39–50)
HGB BLD-MCNC: 14.3 G/DL — SIGNIFICANT CHANGE UP (ref 13–17)
MCHC RBC-ENTMCNC: 30.5 PG — SIGNIFICANT CHANGE UP (ref 27–34)
MCHC RBC-ENTMCNC: 35.4 G/DL — SIGNIFICANT CHANGE UP (ref 32–36)
MCV RBC AUTO: 86.1 FL — SIGNIFICANT CHANGE UP (ref 80–100)
NRBC BLD AUTO-RTO: 0 /100 WBCS — SIGNIFICANT CHANGE UP (ref 0–0)
PLATELET # BLD AUTO: 294 K/UL — SIGNIFICANT CHANGE UP (ref 150–400)
POTASSIUM SERPL-MCNC: 4.2 MMOL/L — SIGNIFICANT CHANGE UP (ref 3.5–5.3)
POTASSIUM SERPL-SCNC: 4.2 MMOL/L — SIGNIFICANT CHANGE UP (ref 3.5–5.3)
PROT SERPL-MCNC: 7.3 G/DL — SIGNIFICANT CHANGE UP (ref 6–8.3)
RBC # BLD: 4.69 M/UL — SIGNIFICANT CHANGE UP (ref 4.2–5.8)
RBC # FLD: 12.1 % — SIGNIFICANT CHANGE UP (ref 10.3–14.5)
SODIUM SERPL-SCNC: 140 MMOL/L — SIGNIFICANT CHANGE UP (ref 135–145)
WBC # BLD: 7.04 K/UL — SIGNIFICANT CHANGE UP (ref 3.8–10.5)
WBC # FLD AUTO: 7.04 K/UL — SIGNIFICANT CHANGE UP (ref 3.8–10.5)

## 2025-04-25 PROCEDURE — 82542 COL CHROMOTOGRAPHY QUAL/QUAN: CPT

## 2025-04-25 PROCEDURE — 86140 C-REACTIVE PROTEIN: CPT

## 2025-04-25 PROCEDURE — 96413 CHEMO IV INFUSION 1 HR: CPT

## 2025-04-25 PROCEDURE — 80230 DRUG ASSAY INFLIXIMAB: CPT

## 2025-04-25 PROCEDURE — 36415 COLL VENOUS BLD VENIPUNCTURE: CPT

## 2025-04-25 PROCEDURE — 80053 COMPREHEN METABOLIC PANEL: CPT

## 2025-04-25 PROCEDURE — 85027 COMPLETE CBC AUTOMATED: CPT

## 2025-04-25 PROCEDURE — 96415 CHEMO IV INFUSION ADDL HR: CPT

## 2025-04-25 RX ORDER — INFLIXIMAB-DYYB 120 MG/ML
780 INJECTION SUBCUTANEOUS ONCE
Refills: 0 | Status: COMPLETED | OUTPATIENT
Start: 2025-04-25 | End: 2025-04-25

## 2025-04-25 RX ADMIN — INFLIXIMAB-DYYB 780 MILLIGRAM(S): 120 INJECTION SUBCUTANEOUS at 12:22

## 2025-04-25 RX ADMIN — INFLIXIMAB-DYYB 125 MILLIGRAM(S): 120 INJECTION SUBCUTANEOUS at 10:24

## 2025-04-30 LAB
ANTIBODIES TO INFLIXIMAB (ATI) CONCENTRATION: < 3.1 U/ML — SIGNIFICANT CHANGE UP
PROMETHEUS ANSER IFX RESULT: SIGNIFICANT CHANGE UP
PROMETHEUS LABORATORY FOOTER: SIGNIFICANT CHANGE UP
SERUM INFLIXIMAB (IFX) CONCENTRATION: 11.2 UG/ML — SIGNIFICANT CHANGE UP

## 2025-05-30 ENCOUNTER — APPOINTMENT (OUTPATIENT)
Dept: INFUSION THERAPY | Facility: CLINIC | Age: 20
End: 2025-05-30

## 2025-05-30 ENCOUNTER — OUTPATIENT (OUTPATIENT)
Dept: OUTPATIENT SERVICES | Facility: HOSPITAL | Age: 20
LOS: 1 days | End: 2025-05-30
Payer: COMMERCIAL

## 2025-05-30 VITALS
TEMPERATURE: 97 F | HEIGHT: 70 IN | WEIGHT: 167.99 LBS | OXYGEN SATURATION: 98 % | SYSTOLIC BLOOD PRESSURE: 131 MMHG | HEART RATE: 66 BPM | RESPIRATION RATE: 18 BRPM | DIASTOLIC BLOOD PRESSURE: 85 MMHG

## 2025-05-30 DIAGNOSIS — K50.00 CROHN'S DISEASE OF SMALL INTESTINE WITHOUT COMPLICATIONS: ICD-10-CM

## 2025-05-30 LAB
ALBUMIN SERPL ELPH-MCNC: 4.5 G/DL — SIGNIFICANT CHANGE UP (ref 3.3–5)
ALP SERPL-CCNC: 62 U/L — SIGNIFICANT CHANGE UP (ref 40–120)
ALT FLD-CCNC: 12 U/L — SIGNIFICANT CHANGE UP (ref 10–45)
ANION GAP SERPL CALC-SCNC: 12 MMOL/L — SIGNIFICANT CHANGE UP (ref 5–17)
AST SERPL-CCNC: 24 U/L — SIGNIFICANT CHANGE UP (ref 10–40)
BILIRUB SERPL-MCNC: 0.2 MG/DL — SIGNIFICANT CHANGE UP (ref 0.2–1.2)
BUN SERPL-MCNC: 12 MG/DL — SIGNIFICANT CHANGE UP (ref 7–23)
CALCIUM SERPL-MCNC: 9.9 MG/DL — SIGNIFICANT CHANGE UP (ref 8.4–10.5)
CHLORIDE SERPL-SCNC: 104 MMOL/L — SIGNIFICANT CHANGE UP (ref 96–108)
CO2 SERPL-SCNC: 25 MMOL/L — SIGNIFICANT CHANGE UP (ref 22–31)
CREAT SERPL-MCNC: 0.78 MG/DL — SIGNIFICANT CHANGE UP (ref 0.5–1.3)
CRP SERPL-MCNC: <3 MG/L — SIGNIFICANT CHANGE UP (ref 0–4)
EGFR: 129 ML/MIN/1.73M2 — SIGNIFICANT CHANGE UP
EGFR: 129 ML/MIN/1.73M2 — SIGNIFICANT CHANGE UP
GLUCOSE SERPL-MCNC: 82 MG/DL — SIGNIFICANT CHANGE UP (ref 70–99)
HCT VFR BLD CALC: 42.4 % — SIGNIFICANT CHANGE UP (ref 39–50)
HGB BLD-MCNC: 15 G/DL — SIGNIFICANT CHANGE UP (ref 13–17)
MCHC RBC-ENTMCNC: 30.5 PG — SIGNIFICANT CHANGE UP (ref 27–34)
MCHC RBC-ENTMCNC: 35.4 G/DL — SIGNIFICANT CHANGE UP (ref 32–36)
MCV RBC AUTO: 86.2 FL — SIGNIFICANT CHANGE UP (ref 80–100)
NRBC BLD AUTO-RTO: 0 /100 WBCS — SIGNIFICANT CHANGE UP (ref 0–0)
PLATELET # BLD AUTO: 290 K/UL — SIGNIFICANT CHANGE UP (ref 150–400)
POTASSIUM SERPL-MCNC: 4.5 MMOL/L — SIGNIFICANT CHANGE UP (ref 3.5–5.3)
POTASSIUM SERPL-SCNC: 4.5 MMOL/L — SIGNIFICANT CHANGE UP (ref 3.5–5.3)
PROT SERPL-MCNC: 7.6 G/DL — SIGNIFICANT CHANGE UP (ref 6–8.3)
RBC # BLD: 4.92 M/UL — SIGNIFICANT CHANGE UP (ref 4.2–5.8)
RBC # FLD: 12.3 % — SIGNIFICANT CHANGE UP (ref 10.3–14.5)
SODIUM SERPL-SCNC: 141 MMOL/L — SIGNIFICANT CHANGE UP (ref 135–145)
WBC # BLD: 9.49 K/UL — SIGNIFICANT CHANGE UP (ref 3.8–10.5)
WBC # FLD AUTO: 9.49 K/UL — SIGNIFICANT CHANGE UP (ref 3.8–10.5)

## 2025-05-30 PROCEDURE — 96415 CHEMO IV INFUSION ADDL HR: CPT

## 2025-05-30 PROCEDURE — 85027 COMPLETE CBC AUTOMATED: CPT

## 2025-05-30 PROCEDURE — 36415 COLL VENOUS BLD VENIPUNCTURE: CPT

## 2025-05-30 PROCEDURE — 80053 COMPREHEN METABOLIC PANEL: CPT

## 2025-05-30 PROCEDURE — 96413 CHEMO IV INFUSION 1 HR: CPT

## 2025-05-30 PROCEDURE — 86140 C-REACTIVE PROTEIN: CPT

## 2025-05-30 RX ORDER — INFLIXIMAB-DYYB 120 MG/ML
780 INJECTION SUBCUTANEOUS ONCE
Refills: 0 | Status: COMPLETED | OUTPATIENT
Start: 2025-05-30 | End: 2025-05-30

## 2025-05-30 RX ADMIN — INFLIXIMAB-DYYB 125 MILLIGRAM(S): 120 INJECTION SUBCUTANEOUS at 09:41

## 2025-05-30 RX ADMIN — INFLIXIMAB-DYYB 780 MILLIGRAM(S): 120 INJECTION SUBCUTANEOUS at 11:30

## 2025-07-03 ENCOUNTER — OUTPATIENT (OUTPATIENT)
Dept: OUTPATIENT SERVICES | Facility: HOSPITAL | Age: 20
LOS: 1 days | End: 2025-07-03
Payer: COMMERCIAL

## 2025-07-03 ENCOUNTER — APPOINTMENT (OUTPATIENT)
Dept: INFUSION THERAPY | Facility: CLINIC | Age: 20
End: 2025-07-03

## 2025-07-03 VITALS
RESPIRATION RATE: 16 BRPM | WEIGHT: 210.98 LBS | HEART RATE: 84 BPM | SYSTOLIC BLOOD PRESSURE: 116 MMHG | DIASTOLIC BLOOD PRESSURE: 76 MMHG | TEMPERATURE: 99 F | HEIGHT: 70 IN | OXYGEN SATURATION: 99 %

## 2025-07-03 VITALS
DIASTOLIC BLOOD PRESSURE: 83 MMHG | HEART RATE: 65 BPM | OXYGEN SATURATION: 99 % | RESPIRATION RATE: 16 BRPM | TEMPERATURE: 98 F | SYSTOLIC BLOOD PRESSURE: 119 MMHG

## 2025-07-03 DIAGNOSIS — K50.00 CROHN'S DISEASE OF SMALL INTESTINE WITHOUT COMPLICATIONS: ICD-10-CM

## 2025-07-03 LAB
ALBUMIN SERPL ELPH-MCNC: 4.7 G/DL — SIGNIFICANT CHANGE UP (ref 3.3–5)
ALP SERPL-CCNC: 63 U/L — SIGNIFICANT CHANGE UP (ref 40–120)
ALT FLD-CCNC: 19 U/L — SIGNIFICANT CHANGE UP (ref 10–45)
ANION GAP SERPL CALC-SCNC: 11 MMOL/L — SIGNIFICANT CHANGE UP (ref 5–17)
AST SERPL-CCNC: 21 U/L — SIGNIFICANT CHANGE UP (ref 10–40)
BILIRUB SERPL-MCNC: 0.5 MG/DL — SIGNIFICANT CHANGE UP (ref 0.2–1.2)
BUN SERPL-MCNC: 14 MG/DL — SIGNIFICANT CHANGE UP (ref 7–23)
CALCIUM SERPL-MCNC: 10 MG/DL — SIGNIFICANT CHANGE UP (ref 8.4–10.5)
CHLORIDE SERPL-SCNC: 103 MMOL/L — SIGNIFICANT CHANGE UP (ref 96–108)
CO2 SERPL-SCNC: 25 MMOL/L — SIGNIFICANT CHANGE UP (ref 22–31)
CREAT SERPL-MCNC: 0.77 MG/DL — SIGNIFICANT CHANGE UP (ref 0.5–1.3)
CRP SERPL-MCNC: <3 MG/L — SIGNIFICANT CHANGE UP (ref 0–4)
EGFR: 130 ML/MIN/1.73M2 — SIGNIFICANT CHANGE UP
EGFR: 130 ML/MIN/1.73M2 — SIGNIFICANT CHANGE UP
GLUCOSE SERPL-MCNC: 105 MG/DL — HIGH (ref 70–99)
HCT VFR BLD CALC: 42.9 % — SIGNIFICANT CHANGE UP (ref 39–50)
HGB BLD-MCNC: 14.9 G/DL — SIGNIFICANT CHANGE UP (ref 13–17)
MCHC RBC-ENTMCNC: 30.8 PG — SIGNIFICANT CHANGE UP (ref 27–34)
MCHC RBC-ENTMCNC: 34.7 G/DL — SIGNIFICANT CHANGE UP (ref 32–36)
MCV RBC AUTO: 88.8 FL — SIGNIFICANT CHANGE UP (ref 80–100)
NRBC # BLD AUTO: 0 K/UL — SIGNIFICANT CHANGE UP (ref 0–0)
NRBC # FLD: 0 K/UL — SIGNIFICANT CHANGE UP (ref 0–0)
NRBC BLD AUTO-RTO: 0 /100 WBCS — SIGNIFICANT CHANGE UP (ref 0–0)
PLATELET # BLD AUTO: 292 K/UL — SIGNIFICANT CHANGE UP (ref 150–400)
PMV BLD: 9.9 FL — SIGNIFICANT CHANGE UP (ref 7–13)
POTASSIUM SERPL-MCNC: 4.2 MMOL/L — SIGNIFICANT CHANGE UP (ref 3.5–5.3)
POTASSIUM SERPL-SCNC: 4.2 MMOL/L — SIGNIFICANT CHANGE UP (ref 3.5–5.3)
PROT SERPL-MCNC: 7.2 G/DL — SIGNIFICANT CHANGE UP (ref 6–8.3)
RBC # BLD: 4.83 M/UL — SIGNIFICANT CHANGE UP (ref 4.2–5.8)
RBC # FLD: 12.5 % — SIGNIFICANT CHANGE UP (ref 10.3–14.5)
SODIUM SERPL-SCNC: 139 MMOL/L — SIGNIFICANT CHANGE UP (ref 135–145)
WBC # BLD: 8.7 K/UL — SIGNIFICANT CHANGE UP (ref 3.8–10.5)
WBC # FLD AUTO: 8.7 K/UL — SIGNIFICANT CHANGE UP (ref 3.8–10.5)

## 2025-07-03 PROCEDURE — 86140 C-REACTIVE PROTEIN: CPT

## 2025-07-03 PROCEDURE — 96413 CHEMO IV INFUSION 1 HR: CPT

## 2025-07-03 PROCEDURE — 85027 COMPLETE CBC AUTOMATED: CPT

## 2025-07-03 PROCEDURE — 36415 COLL VENOUS BLD VENIPUNCTURE: CPT

## 2025-07-03 PROCEDURE — 80053 COMPREHEN METABOLIC PANEL: CPT

## 2025-07-03 RX ORDER — INFLIXIMAB-DYYB 120 MG/ML
780 INJECTION SUBCUTANEOUS ONCE
Refills: 0 | Status: COMPLETED | OUTPATIENT
Start: 2025-07-03 | End: 2025-07-03

## 2025-07-03 RX ADMIN — INFLIXIMAB-DYYB 125 MILLIGRAM(S): 120 INJECTION SUBCUTANEOUS at 09:27

## 2025-07-03 RX ADMIN — INFLIXIMAB-DYYB 780 MILLIGRAM(S): 120 INJECTION SUBCUTANEOUS at 10:55

## 2025-07-03 NOTE — DISCHARGE INSTRUCTIONS: GENERAL THERAPY - I ACKNOWLEDGE THAT I HAVE RECEIVED AND UNDERSTAND THE ABOVE INSTRUCTIONS AND AGREE TO BEING DISCHARGED TODAY
Pt brought in by ambulance from Dr. Bull's office for rule out sepsis. As per pt "I have a cough and runny nose." Son adds pt's blood sugar has been elevated for last several days. FS on scene 243. Statement Selected

## 2025-07-18 ENCOUNTER — APPOINTMENT (OUTPATIENT)
Dept: GASTROENTEROLOGY | Facility: CLINIC | Age: 20
End: 2025-07-18
Payer: COMMERCIAL

## 2025-07-18 PROCEDURE — 99213 OFFICE O/P EST LOW 20 MIN: CPT | Mod: 95

## 2025-08-11 ENCOUNTER — OUTPATIENT (OUTPATIENT)
Dept: OUTPATIENT SERVICES | Facility: HOSPITAL | Age: 20
LOS: 1 days | End: 2025-08-11
Payer: COMMERCIAL

## 2025-08-11 ENCOUNTER — APPOINTMENT (OUTPATIENT)
Dept: INFUSION THERAPY | Facility: CLINIC | Age: 20
End: 2025-08-11

## 2025-08-11 VITALS
RESPIRATION RATE: 16 BRPM | WEIGHT: 171.08 LBS | DIASTOLIC BLOOD PRESSURE: 86 MMHG | SYSTOLIC BLOOD PRESSURE: 154 MMHG | HEIGHT: 70 IN | HEART RATE: 81 BPM | TEMPERATURE: 98 F | OXYGEN SATURATION: 99 %

## 2025-08-11 VITALS
RESPIRATION RATE: 16 BRPM | SYSTOLIC BLOOD PRESSURE: 134 MMHG | DIASTOLIC BLOOD PRESSURE: 78 MMHG | OXYGEN SATURATION: 98 % | TEMPERATURE: 97 F | HEART RATE: 84 BPM

## 2025-08-11 DIAGNOSIS — K50.00 CROHN'S DISEASE OF SMALL INTESTINE WITHOUT COMPLICATIONS: ICD-10-CM

## 2025-08-11 LAB
ALBUMIN SERPL ELPH-MCNC: 4.5 G/DL — SIGNIFICANT CHANGE UP (ref 3.3–5)
ALP SERPL-CCNC: 59 U/L — SIGNIFICANT CHANGE UP (ref 40–120)
ALT FLD-CCNC: 16 U/L — SIGNIFICANT CHANGE UP (ref 10–45)
ANION GAP SERPL CALC-SCNC: 12 MMOL/L — SIGNIFICANT CHANGE UP (ref 5–17)
AST SERPL-CCNC: 27 U/L — SIGNIFICANT CHANGE UP (ref 10–40)
BILIRUB SERPL-MCNC: 0.5 MG/DL — SIGNIFICANT CHANGE UP (ref 0.2–1.2)
BUN SERPL-MCNC: 14 MG/DL — SIGNIFICANT CHANGE UP (ref 7–23)
CALCIUM SERPL-MCNC: 9.7 MG/DL — SIGNIFICANT CHANGE UP (ref 8.4–10.5)
CHLORIDE SERPL-SCNC: 103 MMOL/L — SIGNIFICANT CHANGE UP (ref 96–108)
CO2 SERPL-SCNC: 23 MMOL/L — SIGNIFICANT CHANGE UP (ref 22–31)
CREAT SERPL-MCNC: 0.82 MG/DL — SIGNIFICANT CHANGE UP (ref 0.5–1.3)
CRP SERPL-MCNC: <3 MG/L — SIGNIFICANT CHANGE UP (ref 0–4)
EGFR: 127 ML/MIN/1.73M2 — SIGNIFICANT CHANGE UP
EGFR: 127 ML/MIN/1.73M2 — SIGNIFICANT CHANGE UP
GLUCOSE SERPL-MCNC: 105 MG/DL — HIGH (ref 70–99)
HCT VFR BLD CALC: 42.7 % — SIGNIFICANT CHANGE UP (ref 39–50)
HGB BLD-MCNC: 14.6 G/DL — SIGNIFICANT CHANGE UP (ref 13–17)
MCHC RBC-ENTMCNC: 30.4 PG — SIGNIFICANT CHANGE UP (ref 27–34)
MCHC RBC-ENTMCNC: 34.2 G/DL — SIGNIFICANT CHANGE UP (ref 32–36)
MCV RBC AUTO: 88.8 FL — SIGNIFICANT CHANGE UP (ref 80–100)
NRBC # BLD AUTO: 0 K/UL — SIGNIFICANT CHANGE UP (ref 0–0)
NRBC # FLD: 0 K/UL — SIGNIFICANT CHANGE UP (ref 0–0)
NRBC BLD AUTO-RTO: 0 /100 WBCS — SIGNIFICANT CHANGE UP (ref 0–0)
PLATELET # BLD AUTO: 280 K/UL — SIGNIFICANT CHANGE UP (ref 150–400)
PMV BLD: 10.1 FL — SIGNIFICANT CHANGE UP (ref 7–13)
POTASSIUM SERPL-MCNC: 3.9 MMOL/L — SIGNIFICANT CHANGE UP (ref 3.5–5.3)
POTASSIUM SERPL-SCNC: 3.9 MMOL/L — SIGNIFICANT CHANGE UP (ref 3.5–5.3)
PROT SERPL-MCNC: 7.3 G/DL — SIGNIFICANT CHANGE UP (ref 6–8.3)
RBC # BLD: 4.81 M/UL — SIGNIFICANT CHANGE UP (ref 4.2–5.8)
RBC # FLD: 11.9 % — SIGNIFICANT CHANGE UP (ref 10.3–14.5)
SODIUM SERPL-SCNC: 138 MMOL/L — SIGNIFICANT CHANGE UP (ref 135–145)
WBC # BLD: 7.8 K/UL — SIGNIFICANT CHANGE UP (ref 3.8–10.5)
WBC # FLD AUTO: 7.8 K/UL — SIGNIFICANT CHANGE UP (ref 3.8–10.5)

## 2025-08-11 PROCEDURE — 80053 COMPREHEN METABOLIC PANEL: CPT

## 2025-08-11 PROCEDURE — 96415 CHEMO IV INFUSION ADDL HR: CPT

## 2025-08-11 PROCEDURE — 80230 DRUG ASSAY INFLIXIMAB: CPT

## 2025-08-11 PROCEDURE — 36415 COLL VENOUS BLD VENIPUNCTURE: CPT

## 2025-08-11 PROCEDURE — 85027 COMPLETE CBC AUTOMATED: CPT

## 2025-08-11 PROCEDURE — 96413 CHEMO IV INFUSION 1 HR: CPT

## 2025-08-11 PROCEDURE — 82542 COL CHROMOTOGRAPHY QUAL/QUAN: CPT

## 2025-08-11 PROCEDURE — 86140 C-REACTIVE PROTEIN: CPT

## 2025-08-11 RX ORDER — INFLIXIMAB-DYYB 120 MG/ML
780 INJECTION SUBCUTANEOUS ONCE
Refills: 0 | Status: COMPLETED | OUTPATIENT
Start: 2025-08-11 | End: 2025-08-11

## 2025-08-11 RX ADMIN — INFLIXIMAB-DYYB 780 MILLIGRAM(S): 120 INJECTION SUBCUTANEOUS at 11:15

## 2025-08-11 RX ADMIN — INFLIXIMAB-DYYB 125 MILLIGRAM(S): 120 INJECTION SUBCUTANEOUS at 09:30

## 2025-08-14 LAB
ANTIBODIES TO INFLIXIMAB (ATI) CONCENTRATION: < 3.1 U/ML — SIGNIFICANT CHANGE UP
PROMETHEUS ANSER IFX RESULT: SIGNIFICANT CHANGE UP
PROMETHEUS LABORATORY FOOTER: SIGNIFICANT CHANGE UP
SERUM INFLIXIMAB (IFX) CONCENTRATION: 9.3 UG/ML — SIGNIFICANT CHANGE UP

## 2025-09-12 ENCOUNTER — NON-APPOINTMENT (OUTPATIENT)
Age: 20
End: 2025-09-12

## 2025-09-15 ENCOUNTER — APPOINTMENT (OUTPATIENT)
Dept: INFUSION THERAPY | Facility: CLINIC | Age: 20
End: 2025-09-15

## (undated) DEVICE — FORCEP RADIAL JAW 4 W NDL 2.2MM 2.8MM 240CM ORANGE DISP